# Patient Record
Sex: FEMALE | Race: WHITE | NOT HISPANIC OR LATINO | Employment: STUDENT | URBAN - METROPOLITAN AREA
[De-identification: names, ages, dates, MRNs, and addresses within clinical notes are randomized per-mention and may not be internally consistent; named-entity substitution may affect disease eponyms.]

---

## 2020-12-03 ENCOUNTER — HOSPITAL ENCOUNTER (INPATIENT)
Facility: HOSPITAL | Age: 20
LOS: 2 days | Discharge: HOME | DRG: 177 | End: 2020-12-05
Attending: EMERGENCY MEDICINE | Admitting: INTERNAL MEDICINE
Payer: OTHER GOVERNMENT

## 2020-12-03 DIAGNOSIS — R00.0 TACHYCARDIA: ICD-10-CM

## 2020-12-03 DIAGNOSIS — I26.99 ACUTE PULMONARY EMBOLISM, UNSPECIFIED PULMONARY EMBOLISM TYPE, UNSPECIFIED WHETHER ACUTE COR PULMONALE PRESENT: Primary | ICD-10-CM

## 2020-12-03 DIAGNOSIS — R06.02 SHORTNESS OF BREATH: ICD-10-CM

## 2020-12-03 DIAGNOSIS — R07.9 CHEST PAIN: ICD-10-CM

## 2020-12-03 DIAGNOSIS — I26.99 PULMONARY EMBOLISM: ICD-10-CM

## 2020-12-03 DIAGNOSIS — U07.1 COVID-19: ICD-10-CM

## 2020-12-03 LAB
ALBUMIN SERPL BCP-MCNC: 4.2 G/DL (ref 3.5–5.2)
ALP SERPL-CCNC: 81 U/L (ref 55–135)
ALT SERPL W/O P-5'-P-CCNC: 20 U/L (ref 10–44)
ANION GAP SERPL CALC-SCNC: 9 MMOL/L (ref 8–16)
APTT BLDCRRT: 27.9 SEC (ref 21–32)
AST SERPL-CCNC: 16 U/L (ref 10–40)
B-HCG UR QL: NEGATIVE
BASOPHILS # BLD AUTO: 0.03 K/UL (ref 0–0.2)
BASOPHILS NFR BLD: 0.4 % (ref 0–1.9)
BILIRUB SERPL-MCNC: 0.2 MG/DL (ref 0.1–1)
BUN SERPL-MCNC: 8 MG/DL (ref 6–20)
CALCIUM SERPL-MCNC: 9.8 MG/DL (ref 8.7–10.5)
CHLORIDE SERPL-SCNC: 107 MMOL/L (ref 95–110)
CO2 SERPL-SCNC: 22 MMOL/L (ref 23–29)
CREAT SERPL-MCNC: 0.8 MG/DL (ref 0.5–1.4)
CRP SERPL-MCNC: 9.1 MG/L (ref 0–8.2)
CTP QC/QA: YES
CTP QC/QA: YES
D DIMER PPP IA.FEU-MCNC: 3.39 MG/L FEU
DIFFERENTIAL METHOD: NORMAL
EOSINOPHIL # BLD AUTO: 0.1 K/UL (ref 0–0.5)
EOSINOPHIL NFR BLD: 1.6 % (ref 0–8)
ERYTHROCYTE [DISTWIDTH] IN BLOOD BY AUTOMATED COUNT: 12 % (ref 11.5–14.5)
EST. GFR  (AFRICAN AMERICAN): >60 ML/MIN/1.73 M^2
EST. GFR  (NON AFRICAN AMERICAN): >60 ML/MIN/1.73 M^2
GLUCOSE SERPL-MCNC: 90 MG/DL (ref 70–110)
HCT VFR BLD AUTO: 42.4 % (ref 37–48.5)
HCV AB SERPL QL IA: NEGATIVE
HGB BLD-MCNC: 14.1 G/DL (ref 12–16)
HIV 1+2 AB+HIV1 P24 AG SERPL QL IA: NEGATIVE
IMM GRANULOCYTES # BLD AUTO: 0.02 K/UL (ref 0–0.04)
IMM GRANULOCYTES NFR BLD AUTO: 0.2 % (ref 0–0.5)
INR PPP: 0.9 (ref 0.8–1.2)
LYMPHOCYTES # BLD AUTO: 1.7 K/UL (ref 1–4.8)
LYMPHOCYTES NFR BLD: 20.6 % (ref 18–48)
MCH RBC QN AUTO: 28.4 PG (ref 27–31)
MCHC RBC AUTO-ENTMCNC: 33.3 G/DL (ref 32–36)
MCV RBC AUTO: 86 FL (ref 82–98)
MONOCYTES # BLD AUTO: 0.4 K/UL (ref 0.3–1)
MONOCYTES NFR BLD: 5.3 % (ref 4–15)
NEUTROPHILS # BLD AUTO: 6 K/UL (ref 1.8–7.7)
NEUTROPHILS NFR BLD: 71.9 % (ref 38–73)
NRBC BLD-RTO: 0 /100 WBC
PLATELET # BLD AUTO: 212 K/UL (ref 150–350)
PMV BLD AUTO: 10.1 FL (ref 9.2–12.9)
POTASSIUM SERPL-SCNC: 4.1 MMOL/L (ref 3.5–5.1)
PROT SERPL-MCNC: 7.8 G/DL (ref 6–8.4)
PROTHROMBIN TIME: 10 SEC (ref 9–12.5)
RBC # BLD AUTO: 4.96 M/UL (ref 4–5.4)
SARS-COV-2 RDRP RESP QL NAA+PROBE: POSITIVE
SODIUM SERPL-SCNC: 138 MMOL/L (ref 136–145)
TROPONIN I SERPL DL<=0.01 NG/ML-MCNC: 0.01 NG/ML (ref 0–0.03)
WBC # BLD AUTO: 8.36 K/UL (ref 3.9–12.7)

## 2020-12-03 PROCEDURE — 36415 COLL VENOUS BLD VENIPUNCTURE: CPT

## 2020-12-03 PROCEDURE — 80053 COMPREHEN METABOLIC PANEL: CPT

## 2020-12-03 PROCEDURE — 96360 HYDRATION IV INFUSION INIT: CPT

## 2020-12-03 PROCEDURE — 81025 URINE PREGNANCY TEST: CPT | Performed by: EMERGENCY MEDICINE

## 2020-12-03 PROCEDURE — 86140 C-REACTIVE PROTEIN: CPT

## 2020-12-03 PROCEDURE — 86703 HIV-1/HIV-2 1 RESULT ANTBDY: CPT

## 2020-12-03 PROCEDURE — 85379 FIBRIN DEGRADATION QUANT: CPT

## 2020-12-03 PROCEDURE — 96372 THER/PROPH/DIAG INJ SC/IM: CPT | Mod: 59

## 2020-12-03 PROCEDURE — 85025 COMPLETE CBC W/AUTO DIFF WBC: CPT

## 2020-12-03 PROCEDURE — 20600001 HC STEP DOWN PRIVATE ROOM

## 2020-12-03 PROCEDURE — 84484 ASSAY OF TROPONIN QUANT: CPT

## 2020-12-03 PROCEDURE — 93005 ELECTROCARDIOGRAM TRACING: CPT

## 2020-12-03 PROCEDURE — 93010 ELECTROCARDIOGRAM REPORT: CPT | Mod: ,,, | Performed by: INTERNAL MEDICINE

## 2020-12-03 PROCEDURE — 96361 HYDRATE IV INFUSION ADD-ON: CPT

## 2020-12-03 PROCEDURE — 25500020 PHARM REV CODE 255: Performed by: EMERGENCY MEDICINE

## 2020-12-03 PROCEDURE — 86803 HEPATITIS C AB TEST: CPT

## 2020-12-03 PROCEDURE — 85610 PROTHROMBIN TIME: CPT

## 2020-12-03 PROCEDURE — U0002 COVID-19 LAB TEST NON-CDC: HCPCS | Performed by: EMERGENCY MEDICINE

## 2020-12-03 PROCEDURE — 63600175 PHARM REV CODE 636 W HCPCS: Performed by: EMERGENCY MEDICINE

## 2020-12-03 PROCEDURE — 85730 THROMBOPLASTIN TIME PARTIAL: CPT

## 2020-12-03 PROCEDURE — 93010 EKG 12-LEAD: ICD-10-PCS | Mod: ,,, | Performed by: INTERNAL MEDICINE

## 2020-12-03 PROCEDURE — 25000003 PHARM REV CODE 250: Performed by: EMERGENCY MEDICINE

## 2020-12-03 PROCEDURE — 99291 CRITICAL CARE FIRST HOUR: CPT | Mod: 25

## 2020-12-03 RX ORDER — ENOXAPARIN SODIUM 100 MG/ML
70 INJECTION SUBCUTANEOUS
Status: COMPLETED | OUTPATIENT
Start: 2020-12-03 | End: 2020-12-03

## 2020-12-03 RX ADMIN — SODIUM CHLORIDE 1000 ML: 0.9 INJECTION, SOLUTION INTRAVENOUS at 08:12

## 2020-12-03 RX ADMIN — IOHEXOL 100 ML: 350 INJECTION, SOLUTION INTRAVENOUS at 09:12

## 2020-12-03 RX ADMIN — ENOXAPARIN SODIUM 70 MG: 100 INJECTION SUBCUTANEOUS at 10:12

## 2020-12-04 PROBLEM — U07.1 COVID-19: Status: ACTIVE | Noted: 2020-12-04

## 2020-12-04 PROBLEM — I26.99 ACUTE PULMONARY EMBOLISM: Status: ACTIVE | Noted: 2020-12-04

## 2020-12-04 LAB
ALBUMIN SERPL BCP-MCNC: 3.7 G/DL (ref 3.5–5.2)
ALP SERPL-CCNC: 73 U/L (ref 55–135)
ALT SERPL W/O P-5'-P-CCNC: 16 U/L (ref 10–44)
ANION GAP SERPL CALC-SCNC: 13 MMOL/L (ref 8–16)
ASCENDING AORTA: 2.37 CM
AST SERPL-CCNC: 16 U/L (ref 10–40)
AV INDEX (PROSTH): 0.73
AV MEAN GRADIENT: 3 MMHG
AV PEAK GRADIENT: 4 MMHG
AV VALVE AREA: 2.22 CM2
AV VELOCITY RATIO: 0.72
BASOPHILS # BLD AUTO: 0.04 K/UL (ref 0–0.2)
BASOPHILS NFR BLD: 0.4 % (ref 0–1.9)
BILIRUB SERPL-MCNC: 0.3 MG/DL (ref 0.1–1)
BSA FOR ECHO PROCEDURE: 1.79 M2
BUN SERPL-MCNC: 7 MG/DL (ref 6–20)
CALCIUM SERPL-MCNC: 8.9 MG/DL (ref 8.7–10.5)
CHLORIDE SERPL-SCNC: 109 MMOL/L (ref 95–110)
CO2 SERPL-SCNC: 18 MMOL/L (ref 23–29)
CREAT SERPL-MCNC: 0.7 MG/DL (ref 0.5–1.4)
CV ECHO LV RWT: 0.35 CM
DIFFERENTIAL METHOD: ABNORMAL
DOP CALC AO PEAK VEL: 1.02 M/S
DOP CALC AO VTI: 17.8 CM
DOP CALC LVOT AREA: 3 CM2
DOP CALC LVOT DIAMETER: 1.97 CM
DOP CALC LVOT PEAK VEL: 0.73 M/S
DOP CALC LVOT STROKE VOLUME: 39.6 CM3
DOP CALCLVOT PEAK VEL VTI: 13 CM
ECHO LV POSTERIOR WALL: 0.68 CM (ref 0.6–1.1)
EOSINOPHIL # BLD AUTO: 0.2 K/UL (ref 0–0.5)
EOSINOPHIL NFR BLD: 2 % (ref 0–8)
ERYTHROCYTE [DISTWIDTH] IN BLOOD BY AUTOMATED COUNT: 12.2 % (ref 11.5–14.5)
EST. GFR  (AFRICAN AMERICAN): >60 ML/MIN/1.73 M^2
EST. GFR  (NON AFRICAN AMERICAN): >60 ML/MIN/1.73 M^2
FRACTIONAL SHORTENING: 32 % (ref 28–44)
GLUCOSE SERPL-MCNC: 89 MG/DL (ref 70–110)
HCT VFR BLD AUTO: 39.8 % (ref 37–48.5)
HGB BLD-MCNC: 12.7 G/DL (ref 12–16)
IMM GRANULOCYTES # BLD AUTO: 0.01 K/UL (ref 0–0.04)
IMM GRANULOCYTES NFR BLD AUTO: 0.1 % (ref 0–0.5)
INTERVENTRICULAR SEPTUM: 0.71 CM (ref 0.6–1.1)
LA MAJOR: 3.74 CM
LA MINOR: 3.47 CM
LA WIDTH: 2.53 CM
LEFT ATRIUM SIZE: 2.85 CM
LEFT ATRIUM VOLUME INDEX: 12.6 ML/M2
LEFT ATRIUM VOLUME: 22.06 CM3
LEFT INTERNAL DIMENSION IN SYSTOLE: 2.6 CM (ref 2.1–4)
LEFT VENTRICLE DIASTOLIC VOLUME INDEX: 36.27 ML/M2
LEFT VENTRICLE DIASTOLIC VOLUME: 63.62 ML
LEFT VENTRICLE MASS INDEX: 41 G/M2
LEFT VENTRICLE SYSTOLIC VOLUME INDEX: 14 ML/M2
LEFT VENTRICLE SYSTOLIC VOLUME: 24.61 ML
LEFT VENTRICULAR INTERNAL DIMENSION IN DIASTOLE: 3.84 CM (ref 3.5–6)
LEFT VENTRICULAR MASS: 72.51 G
LYMPHOCYTES # BLD AUTO: 3 K/UL (ref 1–4.8)
LYMPHOCYTES NFR BLD: 32.8 % (ref 18–48)
MCH RBC QN AUTO: 28.6 PG (ref 27–31)
MCHC RBC AUTO-ENTMCNC: 31.9 G/DL (ref 32–36)
MCV RBC AUTO: 90 FL (ref 82–98)
MONOCYTES # BLD AUTO: 0.6 K/UL (ref 0.3–1)
MONOCYTES NFR BLD: 6.5 % (ref 4–15)
NEUTROPHILS # BLD AUTO: 5.2 K/UL (ref 1.8–7.7)
NEUTROPHILS NFR BLD: 58.2 % (ref 38–73)
NRBC BLD-RTO: 0 /100 WBC
PISA TR MAX VEL: 2.9 M/S
PLATELET # BLD AUTO: 219 K/UL (ref 150–350)
PMV BLD AUTO: 11.2 FL (ref 9.2–12.9)
POTASSIUM SERPL-SCNC: 3.8 MMOL/L (ref 3.5–5.1)
PROT SERPL-MCNC: 7 G/DL (ref 6–8.4)
RA MAJOR: 4.04 CM
RA WIDTH: 2.2 CM
RBC # BLD AUTO: 4.44 M/UL (ref 4–5.4)
RIGHT VENTRICULAR END-DIASTOLIC DIMENSION: 2.76 CM
SINUS: 2.6 CM
SODIUM SERPL-SCNC: 140 MMOL/L (ref 136–145)
STJ: 2.41 CM
TDI LATERAL: 0.11 M/S
TDI SEPTAL: 0.11 M/S
TDI: 0.11 M/S
TR MAX PG: 34 MMHG
TRICUSPID ANNULAR PLANE SYSTOLIC EXCURSION: 1.84 CM
WBC # BLD AUTO: 8.99 K/UL (ref 3.9–12.7)

## 2020-12-04 PROCEDURE — 36415 COLL VENOUS BLD VENIPUNCTURE: CPT

## 2020-12-04 PROCEDURE — 85025 COMPLETE CBC W/AUTO DIFF WBC: CPT

## 2020-12-04 PROCEDURE — 85300 ANTITHROMBIN III ACTIVITY: CPT

## 2020-12-04 PROCEDURE — 99900035 HC TECH TIME PER 15 MIN (STAT)

## 2020-12-04 PROCEDURE — 25000003 PHARM REV CODE 250: Performed by: HOSPITALIST

## 2020-12-04 PROCEDURE — 27000646 HC AEROBIKA DEVICE

## 2020-12-04 PROCEDURE — 63600175 PHARM REV CODE 636 W HCPCS: Performed by: HOSPITALIST

## 2020-12-04 PROCEDURE — 81241 F5 GENE: CPT

## 2020-12-04 PROCEDURE — 94664 DEMO&/EVAL PT USE INHALER: CPT

## 2020-12-04 PROCEDURE — 99223 1ST HOSP IP/OBS HIGH 75: CPT | Mod: ,,, | Performed by: HOSPITALIST

## 2020-12-04 PROCEDURE — 20600001 HC STEP DOWN PRIVATE ROOM

## 2020-12-04 PROCEDURE — 80053 COMPREHEN METABOLIC PANEL: CPT

## 2020-12-04 PROCEDURE — 25000003 PHARM REV CODE 250: Performed by: INTERNAL MEDICINE

## 2020-12-04 PROCEDURE — 99223 PR INITIAL HOSPITAL CARE,LEVL III: ICD-10-PCS | Mod: ,,, | Performed by: HOSPITALIST

## 2020-12-04 PROCEDURE — 25500020 PHARM REV CODE 255: Performed by: INTERNAL MEDICINE

## 2020-12-04 PROCEDURE — 94761 N-INVAS EAR/PLS OXIMETRY MLT: CPT

## 2020-12-04 RX ORDER — IBUPROFEN 200 MG
24 TABLET ORAL
Status: DISCONTINUED | OUTPATIENT
Start: 2020-12-04 | End: 2020-12-05 | Stop reason: HOSPADM

## 2020-12-04 RX ORDER — SODIUM CHLORIDE 0.9 % (FLUSH) 0.9 %
10 SYRINGE (ML) INJECTION
Status: DISCONTINUED | OUTPATIENT
Start: 2020-12-04 | End: 2020-12-05 | Stop reason: HOSPADM

## 2020-12-04 RX ORDER — IBUPROFEN 200 MG
16 TABLET ORAL
Status: DISCONTINUED | OUTPATIENT
Start: 2020-12-04 | End: 2020-12-05 | Stop reason: HOSPADM

## 2020-12-04 RX ORDER — TALC
6 POWDER (GRAM) TOPICAL NIGHTLY PRN
Status: DISCONTINUED | OUTPATIENT
Start: 2020-12-04 | End: 2020-12-05 | Stop reason: HOSPADM

## 2020-12-04 RX ORDER — ONDANSETRON 2 MG/ML
4 INJECTION INTRAMUSCULAR; INTRAVENOUS EVERY 8 HOURS PRN
Status: DISCONTINUED | OUTPATIENT
Start: 2020-12-04 | End: 2020-12-05 | Stop reason: HOSPADM

## 2020-12-04 RX ORDER — ALBUTEROL SULFATE 90 UG/1
2 AEROSOL, METERED RESPIRATORY (INHALATION) EVERY 4 HOURS PRN
Status: DISCONTINUED | OUTPATIENT
Start: 2020-12-04 | End: 2020-12-05 | Stop reason: HOSPADM

## 2020-12-04 RX ORDER — ACETAMINOPHEN 325 MG/1
650 TABLET ORAL EVERY 4 HOURS PRN
Status: DISCONTINUED | OUTPATIENT
Start: 2020-12-04 | End: 2020-12-05 | Stop reason: HOSPADM

## 2020-12-04 RX ORDER — MUPIROCIN 20 MG/G
OINTMENT TOPICAL 2 TIMES DAILY
Status: DISCONTINUED | OUTPATIENT
Start: 2020-12-04 | End: 2020-12-05 | Stop reason: HOSPADM

## 2020-12-04 RX ORDER — ENOXAPARIN SODIUM 100 MG/ML
1 INJECTION SUBCUTANEOUS
Status: DISCONTINUED | OUTPATIENT
Start: 2020-12-04 | End: 2020-12-04

## 2020-12-04 RX ADMIN — MELATONIN TAB 3 MG 6 MG: 3 TAB at 08:12

## 2020-12-04 RX ADMIN — ENOXAPARIN SODIUM 70 MG: 100 INJECTION SUBCUTANEOUS at 08:12

## 2020-12-04 RX ADMIN — MUPIROCIN: 20 OINTMENT TOPICAL at 08:12

## 2020-12-04 RX ADMIN — HUMAN ALBUMIN MICROSPHERES AND PERFLUTREN 0.66 MG: 10; .22 INJECTION, SOLUTION INTRAVENOUS at 02:12

## 2020-12-04 RX ADMIN — APIXABAN 10 MG: 5 TABLET, FILM COATED ORAL at 08:12

## 2020-12-04 NOTE — ED PROVIDER NOTES
Encounter Date: 12/3/2020    SCRIBE #1 NOTE: INicole, am scribing for, and in the presence of, Dr. Saavedra.   SCRIBE #2 NOTE: I, Bceka Mena, am scribing for, and in the presence of, Dr. Saavedra.     History     Chief Complaint   Patient presents with    Shortness of Breath     Tested positive for Covid 19 last week.  Today states that she is feeling SOA and heart is racing.  Patient states hx of asthma     Time seen by provider: 8:45 PM    This is a 20 y.o. female with history of asthma who presents with complaint of shortness of breath for nine days. She reports associated cough, fever, body aches, and congestion. She tested positive for COVID-19 eight days ago. Her symptoms were improving yesterday yesterday, though she woke from sleep with increased shortness of breath and chest pain today. She no longer has fever. She denies nausea, vomiting, diarrhea, or any urinary symptoms.    The history is provided by the patient.     Review of patient's allergies indicates:  No Known Allergies  Past Medical History:   Diagnosis Date    Asthma      History reviewed. No pertinent surgical history.  No family history on file.  Social History     Tobacco Use    Smoking status: Never Smoker   Substance Use Topics    Alcohol use: Yes     Comment: occasionally    Drug use: Not on file     Review of Systems   Constitutional: Positive for chills and fever (resolved).   HENT: Positive for congestion. Negative for sore throat.    Respiratory: Positive for cough and shortness of breath.    Cardiovascular: Positive for chest pain.   Gastrointestinal: Negative for diarrhea, nausea and vomiting.   Genitourinary: Negative for dysuria.   Musculoskeletal: Positive for myalgias. Negative for back pain.   Skin: Negative for rash.   Neurological: Negative for dizziness, weakness and headaches.   Hematological: Does not bruise/bleed easily.       Physical Exam     Initial Vitals [12/03/20 1819]   BP Pulse Resp Temp SpO2   (!) 141/95  (!) 116 20 97.9 °F (36.6 °C) 97 %      MAP       --         Physical Exam    Nursing note and vitals reviewed.  Constitutional: She appears well-developed and well-nourished. She is not diaphoretic. No distress.   HENT:   Head: Normocephalic and atraumatic.   Eyes: Conjunctivae and EOM are normal.   Neck: Normal range of motion.   Cardiovascular: Regular rhythm and normal heart sounds. Tachycardia present.    Pulmonary/Chest: Breath sounds normal. No respiratory distress. She has no wheezes. She has no rhonchi. She has no rales.   Slight increased work of breathing.   Abdominal: Soft. Bowel sounds are normal. She exhibits no distension. There is no abdominal tenderness.   Musculoskeletal: Normal range of motion. No tenderness or edema.   Neurological: She is alert and oriented to person, place, and time.   Ambulatory with steady gait   Skin: Skin is warm and dry. Capillary refill takes less than 2 seconds.   Psychiatric: She has a normal mood and affect. Her behavior is normal.         ED Course   Critical Care    Date/Time: 12/3/2020 11:19 PM  Performed by: Anabel Saavedra MD  Authorized by: Anabel Saavedra MD   Direct patient critical care time: 10 minutes  Additional history critical care time: 5 minutes  Ordering / reviewing critical care time: 5 minutes  Documentation critical care time: 5 minutes  Consulting other physicians critical care time: 5 minutes  Total critical care time (exclusive of procedural time) : 30 minutes  Critical care time was exclusive of separately billable procedures and treating other patients and teaching time.  Critical care was necessary to treat or prevent imminent or life-threatening deterioration of the following conditions: circulatory failure.  Critical care was time spent personally by me on the following activities: evaluation of patient's response to treatment, obtaining history from patient or surrogate, ordering and review of laboratory studies, pulse oximetry, review of  old charts, re-evaluation of patient's condition, ordering and review of radiographic studies, ordering and performing treatments and interventions, discussions with consultants, development of treatment plan with patient or surrogate and examination of patient.        Labs Reviewed   COMPREHENSIVE METABOLIC PANEL - Abnormal; Notable for the following components:       Result Value    CO2 22 (*)     All other components within normal limits   C-REACTIVE PROTEIN - Abnormal; Notable for the following components:    CRP 9.1 (*)     All other components within normal limits   D DIMER, QUANTITATIVE - Abnormal; Notable for the following components:    D-Dimer 3.39 (*)     All other components within normal limits   SARS-COV-2 RDRP GENE - Abnormal; Notable for the following components:    POC Rapid COVID Positive (*)     All other components within normal limits   HIV 1 / 2 ANTIBODY   HEPATITIS C ANTIBODY   CBC W/ AUTO DIFFERENTIAL   PROTIME-INR   APTT   PROTIME-INR   APTT   TROPONIN I   POCT URINE PREGNANCY     EKG Readings: (Independently Interpreted)   1848: Sinus tachycardia at a rate of 106 bpm. Normal intervals. Normal axis. No ST or ischemic changes.        Imaging Results          CTA Chest Non-Coronary (PE Study) (Final result)  Result time 12/03/20 21:41:46    Final result by Pura Urena MD (12/03/20 21:41:46)                 Impression:      Extensive right-sided pulmonary thromboemboli.  Additional small segmental emboli seen on the left.  No convincing evidence of right heart strain at this time.  No evidence of pulmonary hemorrhage or infarction.    COMMUNICATION  This critical result was discovered/received on 12/3/2020 at 21:35.    The critical information above was relayed directly by Pura Urena MD by telephone to Dr. Anabel Saavedra on 12/3/2020 at 21:39.      Electronically signed by: Pura Urena MD  Date:    12/03/2020  Time:    21:41             Narrative:    EXAMINATION:  CTA CHEST NON  CORONARY    CLINICAL HISTORY:  PE suspected, intermediate prob, positive D-dimer;    TECHNIQUE:  Low dose axial images, sagittal and coronal reformations were obtained from the thoracic inlet to the lung bases following the IV administration of 100 mL of Omnipaque 350.  Contrast timing was optimized to evaluate the pulmonary arteries.  MIP images were performed.    COMPARISON:  None    FINDINGS:  Structures at the base of the neck are unremarkable.  Aorta is non-aneurysmal.  The heart is normal in size without pericardial effusion.  Pulmonary thromboembolus is seen within right main pulmonary artery extending into the upper, middle, and lobar arteries as well as segmental branches.  Suspected small segmental emboli also seen involving a left upper and lower lobe segmental branch.  No evidence of more central or saddle embolus.  There is no evidence of mediastinal, axillary, or hilar lymph node enlargement.  The esophagus is unremarkable along its course.    The trachea and bronchi are patent.  The lungs are symmetrically expanded.  Lungs are clear with no evidence of pulmonary hemorrhage or infarction.  No consolidation or pleural effusion.    The visualized abdominal structures are unremarkable.  No acute osseous abnormality identified.  Extrathoracic soft tissues are unremarkable.                               X-Ray Chest AP Portable (Final result)  Result time 12/03/20 19:46:52    Final result by Pura Urena MD (12/03/20 19:46:52)                 Impression:      No acute cardiopulmonary process identified.      Electronically signed by: Pura Urena MD  Date:    12/03/2020  Time:    19:46             Narrative:    EXAMINATION:  XR CHEST AP PORTABLE    CLINICAL HISTORY:  Shortness of breath    TECHNIQUE:  Single frontal view of the chest was performed.    COMPARISON:  None    FINDINGS:  Cardiac silhouette is normal in size.  Lungs are symmetrically expanded.  No evidence of focal consolidative process,  pneumothorax, or significant pleural effusion.  No acute osseous abnormality identified.                              X-Rays:   Independently Interpreted Readings:   Chest X-Ray: Trachea midline. No cardiomegaly. No effusion, edema or pneumothorax.      Medical Decision Making:   History:   Old Medical Records: I decided to obtain old medical records.  Old Records Summarized: other records.  Initial Assessment:   8:45PM:  Patient is a 20-year-old female who presents to the emergency department with worsening chest pain or shortness of breath after being diagnosed with COVID last week.  Patient appears well, nontoxic.  She is tachycardic and she does seem short of breath on exam.  Will plan for labs, x-ray, will continue to follow and reassess.  Independently Interpreted Test(s):   I have ordered and independently interpreted X-rays - see prior notes.  I have ordered and independently interpreted EKG Reading(s) - see prior notes  Clinical Tests:   Lab Tests: Ordered and Reviewed  Radiological Study: Ordered and Reviewed  Medical Tests: Ordered and Reviewed  Other:   I have discussed this case with another health care provider.    9:04PM:  Patient has a normal chest x-ray.  However she does have a very elevated D-dimer.  Will plan for a CTA for further evaluation  Will continue to follow.    9:39PM:  I spoke with radiologist regarding the patient's CTA.  Patient has a large right-sided pulmonary embolism, though with no evidence of right heart strain.    9:47 PM:  I discussed with the transfer center regarding the need to transfer the patient.  Will continue to follow.    9:55PM:  I updated the patient regarding the results along with plan for transfer to [facility].  Patient agreeable to plan and all questions answered.      10:08 PM: Discussed case with Dr. Munroe at [facility], who accepted transfer.            Scribe Attestation:   Scribe #1: I performed the above scribed service and the documentation accurately  describes the services I performed. I attest to the accuracy of the note.  Scribe #2: I performed the above scribed service and the documentation accurately describes the services I performed. I attest to the accuracy of the note.    Attending Attestation:           Physician Attestation for Scribe:  Physician Attestation Statement for Scribe #1: I, Dr. Saavedra, reviewed documentation, as scribed by Nicole Guillen in my presence, and it is both accurate and complete.   Physician Attestation Statement for Scribe #2: I, Dr. Saavedra, reviewed documentation, as scribed by Becka Mena in my presence, and it is both accurate and complete. I also acknowledge and confirm the content of the note done by Scribe #1.                        Clinical Impression:       ICD-10-CM ICD-9-CM   1. Acute pulmonary embolism, unspecified pulmonary embolism type, unspecified whether acute cor pulmonale present  I26.99 415.19   2. Tachycardia  R00.0 785.0   3. Shortness of breath  R06.02 786.05   4. Chest pain  R07.9 786.50   5. COVID-19  U07.1 079.89                          ED Disposition Condition    Transfer to Another Facility Stable                            Anabel Saavedra MD  12/03/20 [id number]

## 2020-12-04 NOTE — ED TRIAGE NOTES
Pt presents to ER via POV with c/o worsening SOB and midsternal CP that radiates to midback with inspiration x1 week. Pt reports testing positive for COVID appx 1 week ago. Denies fever/chills. Hx asthma.

## 2020-12-04 NOTE — PLAN OF CARE
-POST-ROUNDS UPDATE-    Ms. Hale is a 20-year-old woman with asthma who presented to [facility] with dyspnea and chest pain and was diagnosed with acute pulmonary embolism without cor pulmonale. She had recently tested positive for COVID-19 and was transferred to the COVID unit at [facility]. No signs of right heart strain on admission CTA. Here she has remained hemodynamically stable on room air, tolerating therapeutic LMWH without issue. PESI 42, I.e. class I indicating low risk from PE with the caveat that COVID patients were not a part of the creation of that risk assessment. Will plan for factor V Leiden testing as well as TTE and lower extremity duplex. Her symptoms are improving.    - Continue therapeutic dose LMWH, will swap to DOAC tonight if TTE completed and treat for at least 3 months for provoked VTE  - TTE  - US lower extremity duplex  - Factor V Leiden ordered. She will need to complete the remainder of her hypercoagulability workup after treatment for her VTE has concluded  - No steroid, antiviral or plasma therapy indicated given absent respiratory failure  - Tele ordered    Rigo Gonzalez M.D.  Department of Hospital Medicine  [facility]  [phone] (pager)

## 2020-12-04 NOTE — PLAN OF CARE
Problem: Adult Inpatient Plan of Care  Goal: Plan of Care Review  Outcome: Ongoing, Progressing  Goal: Patient-Specific Goal (Individualization)  Outcome: Ongoing, Progressing  Goal: Absence of Hospital-Acquired Illness or Injury  Outcome: Ongoing, Progressing  Goal: Optimal Comfort and Wellbeing  Outcome: Ongoing, Progressing  Goal: Readiness for Transition of Care  Outcome: Ongoing, Progressing  Goal: Rounds/Family Conference  Outcome: Ongoing, Progressing     Problem: Venous Thromboembolism  Goal: VTE (Venous Thromboembolism) Symptom Resolution  Outcome: Ongoing, Progressing

## 2020-12-04 NOTE — CARE UPDATE
At Ms. Hale' request I called her father Ken at [phone] and updated him about her diagnosis and the plan going forward. All questions answered.    Rigo Gonzalez M.D.  Department of [facility]  [facility]  [phone] (pager)

## 2020-12-04 NOTE — PLAN OF CARE
(Physician in Lead of Transfers)   Outside Transfer Acceptance Note / Regional Referral Center    Upon patient arrival to floor, please call extension [phone] (if no answer, this will flip to a beeper, so enter your call back number) for Hospital Medicine admit team assignment and for additional admit orders for the patient.  Do not page the attending physician associated with the patient on arrival (this physician may not be on duty at the time of arrival).  Rather, always call [phone] to reach the triage physician for orders and team assignment.    Transferring Physician: Anabel Saavedra MD    Accepting Physician: Ambar Munroe MD    Date of Acceptance: 12/03/2020    Transferring Facility: [facility] ([facility])    Reason for Transfer: COVID +    Report from Transferring Physician/Hospital course:  Patient is a 20 y.o. female who has a past medical history of Asthma presented with complaint of SOB that began 2 days ago. She reports cough, fever, body aches, congestion for a week. She tested positive for COVID-19 2 days ago. Her symptoms have improved though she now feels SOB with chest pain. Work up in ED showed elevated D-dimer. CTA with extensive right-sided pulmonary thromboemboli. No signs of right heart strain. Hemodynamically stable. Not hypoxic. Started on full dose Lovenox. Transferring due to COVID status.       Vital Signs (Most Recent):  Temp: 97.9 °F (36.6 °C) (12/03/20 1819)  Pulse: 99 (12/03/20 2158)  Resp: 20 (12/03/20 1819)  BP: (!) 171/89 (12/03/20 2131)  SpO2: 98 % (12/03/20 2158) Vital Signs (24h Range):  Temp:  [97.9 °F (36.6 °C)] 97.9 °F (36.6 °C)  Pulse:  [] 99  Resp:  [20] 20  SpO2:  [97 %-100 %] 98 %  BP: (112-171)/(66-95) 171/89       Labs & Radiographs: see EPIC    Significant Labs:   CMP:   Recent Labs   Lab 12/03/20 2027      K 4.1      CO2 22*   GLU 90   BUN 8   CREATININE 0.8   CALCIUM 9.8   PROT 7.8   ALBUMIN 4.2   BILITOT 0.2   ALKPHOS 81   AST 16   ALT 20    ANIONGAP 9   ESTGFRAFRICA >60   EGFRNONAA >60   , CBC:   Recent Labs   Lab 12/03/20 2027   WBC 8.36   HGB 14.1   HCT 42.4      , INR:   Recent Labs   Lab 12/03/20 2027   INR 0.9   , Lipid Panel No results for input(s): CHOL, HDL, LDLCALC, TRIG, CHOLHDL in the last 48 hours. and Troponin No results for input(s): TROPONINI in the last 48 hours.    Significant Imaging:  CTA Chest Non-Coronary (PE Study)  Narrative: EXAMINATION:  CTA CHEST NON CORONARY    CLINICAL HISTORY:  PE suspected, intermediate prob, positive D-dimer;    TECHNIQUE:  Low dose axial images, sagittal and coronal reformations were obtained from the thoracic inlet to the lung bases following the IV administration of 100 mL of Omnipaque 350.  Contrast timing was optimized to evaluate the pulmonary arteries.  MIP images were performed.    COMPARISON:  None    FINDINGS:  Structures at the base of the neck are unremarkable.  Aorta is non-aneurysmal.  The heart is normal in size without pericardial effusion.  Pulmonary thromboembolus is seen within right main pulmonary artery extending into the upper, middle, and lobar arteries as well as segmental branches.  Suspected small segmental emboli also seen involving a left upper and lower lobe segmental branch.  No evidence of more central or saddle embolus.  There is no evidence of mediastinal, axillary, or hilar lymph node enlargement.  The esophagus is unremarkable along its course.    The trachea and bronchi are patent.  The lungs are symmetrically expanded.  Lungs are clear with no evidence of pulmonary hemorrhage or infarction.  No consolidation or pleural effusion.    The visualized abdominal structures are unremarkable.  No acute osseous abnormality identified.  Extrathoracic soft tissues are unremarkable.  Impression: Extensive right-sided pulmonary thromboemboli.  Additional small segmental emboli seen on the left.  No convincing evidence of right heart strain at this time.  No evidence of  pulmonary hemorrhage or infarction.    COMMUNICATION  This critical result was discovered/received on 12/3/2020 at 21:35.    The critical information above was relayed directly by Pura Urena MD by telephone to Dr. Anabel Saavedra on 12/3/2020 at 21:39.    Electronically signed by: Pura Urena MD  Date:    12/03/2020  Time:    21:41  X-Ray Chest AP Portable  Narrative: EXAMINATION:  XR CHEST AP PORTABLE    CLINICAL HISTORY:  Shortness of breath    TECHNIQUE:  Single frontal view of the chest was performed.    COMPARISON:  None    FINDINGS:  Cardiac silhouette is normal in size.  Lungs are symmetrically expanded.  No evidence of focal consolidative process, pneumothorax, or significant pleural effusion.  No acute osseous abnormality identified.  Impression: No acute cardiopulmonary process identified.    Electronically signed by: Pura Urena MD  Date:    12/03/2020  Time:    19:46        To Do List:   1. Admit to  COVID unit  2. Management per COVID protocol   3. Cont Lovenox       Upon patient arrival to floor, please call extension [phone] (if no answer, this will flip to a beeper, so enter your call back number) for Hospital Medicine admit team assignment and for additional admit orders for the patient.  Do not page the attending physician associated with the patient on arrival (this physician may not be on duty at the time of arrival).  Rather, always call [phone] to reach the triage physician for orders and team assignment.      Ambar Munroe MD  Hospital Medicine Staff

## 2020-12-04 NOTE — PLAN OF CARE
Per patient request - This  set up hospital follow-up with local provider and [facility] in [place]. Karie Ahumada NP hospital follow-up on 12/18/2020 @ 1:30pm (Arrive 15 minutes prior to appointment). Appointment noted on AVS.    Jahaira Yeager RN  Case Management  Ext [phone]

## 2020-12-04 NOTE — H&P
Hospital Medicine  History and Physical Exam    Team: [facility] HOSP MED G Ermias Bowman MD  Admit Date: 12/3/2020  Principal Problem:  Acute pulmonary embolism   Patient information was obtained from patient, past medical records and ER records.   Primary care Physician: Primary Doctor No  Code status: Full Code    HPI: Per : Patient is a 20 y.o. female who has a past medical history of Asthma presented with complaint of SOB that began 2 days ago. She reports cough, fever, body aches, congestion for a week. She tested positive for COVID-19 2 days ago. Her symptoms have improved though she now feels SOB with chest pain. Work up in ED showed elevated D-dimer. CTA with extensive right-sided pulmonary thromboemboli. No signs of right heart strain. Hemodynamically stable. Not hypoxic. Started on full dose Lovenox. Transferring due to COVID status.     On my assessment, pt. Resting comfortably. She provides a similar history to above. She reports developing URI symptoms about 10 days ago,  tested positive about 8 days ago. She states that she had cough, fatigue, and body aches. She would occasionally have HOPE.This monring, however she woke up with worsening SOB and chest pain. She denies any notable leg swelling or pain. She denies any family history of blood clotting disorders. She does report taking oral contraceptives    No results found for: HGBA1C    Past Medical History: Patient has a past medical history of Asthma.    Past Surgical History: Patient has no past surgical history on file.    Social History: Patient reports that she has never smoked. She does not have any smokeless tobacco history on file. She reports current alcohol use.    Family History: No reported history of clotting disorders  Medications: reviewed     Allergies: Patient has No Known Allergies.    ROS  Pain Scale:0 /10   Constitutional: no fever or chills  Respiratory: Positive for cough, SOB  Cardiovascular: Positive for chest  pain  Gastrointestinal: no nausea or vomiting, no abdominal pain or change in bowel habits  Genitourinary: no hematuria or dysuria  Integument/Breast: no rash or pruritis  Hematologic/Lymphatic: no easy bruising or lymphadenopathy  Musculoskeletal: no arthralgias or myalgias  Neurological: no seizures or tremors  Behavioral/Psych: no depression or anxiety    PEx  Temp:  [97.9 °F (36.6 °C)-98.1 °F (36.7 °C)]   Pulse:  []   Resp:  [18-20]   BP: (112-171)/(66-95)   SpO2:  [97 %-100 %]   Body mass index is 28.34 kg/m².   No intake or output data in the 24 hours ending 12/04/20 0015    General appearance: no distress, pt. Resting comfortably  Mental status: Alert and oriented x 3  HEENT:  conjunctivae/corneas clear, PERRL  Neck: supple, thyroid not enlarged  Pulm:   normal respiratory effort, CTA B, no c/w/r  Card: RRR, S1, S2 normal, no murmur, click, rub or gallop  Abd: soft, NT, ND, BS present; no masses, no organomegaly  Ext: no c/c/e  Pulses: 2+, symmetric  Skin: color, texture, turgor normal. No rashes or lesions  Neuro: CN II-XII grossly intact, no focal numbness or weakness, normal strength and tone     Recent Results (from the past 24 hour(s))   POCT urine pregnancy    Collection Time: 12/03/20  6:38 PM   Result Value Ref Range    POC Preg Test, Ur Negative Negative     Acceptable Yes    HIV 1/2 Ag/Ab (4th Gen)    Collection Time: 12/03/20  6:54 PM   Result Value Ref Range    HIV 1/2 Ag/Ab Negative Negative   Hepatitis C antibody    Collection Time: 12/03/20  6:54 PM   Result Value Ref Range    Hepatitis C Ab Negative Negative   Comprehensive metabolic panel    Collection Time: 12/03/20  8:27 PM   Result Value Ref Range    Sodium 138 136 - 145 mmol/L    Potassium 4.1 3.5 - 5.1 mmol/L    Chloride 107 95 - 110 mmol/L    CO2 22 (L) 23 - 29 mmol/L    Glucose 90 70 - 110 mg/dL    BUN 8 6 - 20 mg/dL    Creatinine 0.8 0.5 - 1.4 mg/dL    Calcium 9.8 8.7 - 10.5 mg/dL    Total Protein 7.8 6.0 - 8.4  g/dL    Albumin 4.2 3.5 - 5.2 g/dL    Total Bilirubin 0.2 0.1 - 1.0 mg/dL    Alkaline Phosphatase 81 55 - 135 U/L    AST 16 10 - 40 U/L    ALT 20 10 - 44 U/L    Anion Gap 9 8 - 16 mmol/L    eGFR if African American >60 >60 mL/min/1.73 m^2    eGFR if non African American >60 >60 mL/min/1.73 m^2   CBC auto differential    Collection Time: 12/03/20  8:27 PM   Result Value Ref Range    WBC 8.36 3.90 - 12.70 K/uL    RBC 4.96 4.00 - 5.40 M/uL    Hemoglobin 14.1 12.0 - 16.0 g/dL    Hematocrit 42.4 37.0 - 48.5 %    MCV 86 82 - 98 fL    MCH 28.4 27.0 - 31.0 pg    MCHC 33.3 32.0 - 36.0 g/dL    RDW 12.0 11.5 - 14.5 %    Platelets 212 150 - 350 K/uL    MPV 10.1 9.2 - 12.9 fL    Immature Granulocytes 0.2 0.0 - 0.5 %    Gran # (ANC) 6.0 1.8 - 7.7 K/uL    Immature Grans (Abs) 0.02 0.00 - 0.04 K/uL    Lymph # 1.7 1.0 - 4.8 K/uL    Mono # 0.4 0.3 - 1.0 K/uL    Eos # 0.1 0.0 - 0.5 K/uL    Baso # 0.03 0.00 - 0.20 K/uL    nRBC 0 0 /100 WBC    Gran % 71.9 38.0 - 73.0 %    Lymph % 20.6 18.0 - 48.0 %    Mono % 5.3 4.0 - 15.0 %    Eosinophil % 1.6 0.0 - 8.0 %    Basophil % 0.4 0.0 - 1.9 %    Differential Method Automated    C-reactive protein    Collection Time: 12/03/20  8:27 PM   Result Value Ref Range    CRP 9.1 (H) 0.0 - 8.2 mg/L   D dimer, quantitative    Collection Time: 12/03/20  8:27 PM   Result Value Ref Range    D-Dimer 3.39 (H) <0.50 mg/L FEU   Protime-INR    Collection Time: 12/03/20  8:27 PM   Result Value Ref Range    Prothrombin Time 10.0 9.0 - 12.5 sec    INR 0.9 0.8 - 1.2   APTT    Collection Time: 12/03/20  8:27 PM   Result Value Ref Range    aPTT 27.9 21.0 - 32.0 sec   Troponin I    Collection Time: 12/03/20  8:27 PM   Result Value Ref Range    Troponin I 0.008 0.000 - 0.026 ng/mL   POCT COVID-19 Rapid Screening    Collection Time: 12/03/20 10:02 PM   Result Value Ref Range    POC Rapid COVID Positive (A) Negative     Acceptable Yes        No results for input(s): POCTGLUCOSE in the last 168  hours.    There are no hospital problems to display for this patient.        Assessment and Plan:  Acute PE  -Continue 1mg/kg lovenox therapy. No R heart strain noted on CTA, vitals stable on RA, and tropnin WNL. Official TTE ordered and pending   -PE could be considered provoked from COVID-19 and also birth control use, but consider hypercoaguable work-up inpatient vs. outpatient given patient's young age  -Plan to transition to DOAC at discharge    COVID-19  - COVID-19 testing POSITIVE    -No acute issues related to COVID-19 other than above PE. No need for abx therapy or steroids. Continue to monitor paeint with outline of care below.      - Infection Control notified     - Isolation:   - Airborne, Contact and Droplet Precautions  - Cohort patients into COVID units  - N95 mask, wear eye protection  - 20 second hand hygiene              - Limit visitors per hospital policy              - Consolidating lab draws, nursing care, provider visits, and interventions    - Diagnostics: (leukopenia, hyponatremia, hyperferritinemia, elevated troponin, elevated d-dimer, age, and comorbidities are significant predictors of poor clinical outcome)  CBC, CMP, Procalcitonin, Ferritin, CRP, BNP, Troponin, ECG and Portable CXR    - Management:  Supplemental O2 to maintain SpO2 >92%  Telemetry  Continuous/intermittent Pulse Ox  Albuterol treatment PRN        DVT PPx: Lovenox    Ermias Bowman MD  Hospital Medicine Staff  [phone] pager

## 2020-12-04 NOTE — FIRST PROVIDER EVALUATION
Emergency Department TeleTriage Encounter Note      CHIEF COMPLAINT    Chief Complaint   Patient presents with    Shortness of Breath     Tested positive for Covid 19 last week.  Today states that she is feeling SOA and heart is racing.  Patient states hx of asthma       VITAL SIGNS   Initial Vitals [12/03/20 1819]   BP Pulse Resp Temp SpO2   (!) 141/95 (!) 116 20 97.9 °F (36.6 °C) 97 %      MAP       --            ALLERGIES    Review of patient's allergies indicates:  No Known Allergies    PROVIDER TRIAGE NOTE  This is a teletriage evaluation of a 20 y.o. female presenting to the ED with c/o shortness of breath and heart racing sensation. +COVID. Hx of asthma, used inhaler at home, not helping. Initial orders will be placed and care will be transferred to an alternate provider when patient is roomed for a full evaluation. Any additional orders and the final disposition will be determined by that provider.         ORDERS  Labs Reviewed   HIV 1 / 2 ANTIBODY   HEPATITIS C ANTIBODY   POCT URINE PREGNANCY       ED Orders (720h ago, onward)    Start Ordered     Status Ordering Provider    12/03/20 1829 12/03/20 1828  Airborne and Contact and Droplet Isolation Status  Continuous      Ordered [facility]    12/03/20 1828 12/03/20 1827  X-Ray Chest AP Portable  1 time imaging      Ordered [facility]    12/03/20 1827 12/03/20 1827  EKG 12-lead  Once      Ordered [facility]    12/03/20 1822 12/03/20 1821  HIV 1/2 Ag/Ab (4th Gen)  STAT  Collect    Ordered [facility]    12/03/20 1822 12/03/20 1821  Hepatitis C antibody  STAT  Collect    Ordered [facility]    12/03/20 1822 12/03/20 1821  POCT urine pregnancy  Once      Ordered [facility]            Virtual Visit Note: The provider triage portion of this emergency department evaluation and documentation was performed via [employer], a HIPAA-compliant telemedicine application, in concert with a tele-presenter in the room. A face to face  patient evaluation with one of my colleagues will occur once the patient is placed in an emergency department room.      DISCLAIMER: This note was prepared with [employer] voice recognition transcription software. Garbled syntax, mangled pronouns, and other bizarre constructions may be attributed to that software system.

## 2020-12-04 NOTE — PLAN OF CARE
CM called and spoke with patient in [phone] for Discharge Planning Assessment. Per patient, she lives with her boyfriend in a single family apartment with 13 steps to porch and point of entry.  Patient was independent with ADLS and DID NOT use DME or in-home assistive equipment. She is not on dialysis  or COUMADIN,  she is not on any prescription medications / has resources for all daily and prescriptive needs.She does not have a local pharmacy as she is here as a college student and originally from [facility] - Agreeable to bedside delivery.   Will have help from  boyfriend (Acosta) and other immediate family upon discharge- Acosta to provide transportation home. All questions addressed. Unit and CM direct numbers provided. Will continue to follow for course of hospitalization    12/3/2020  7:47 PM  Shortness of breath [R06.02]  Tachycardia [R00.0]  Chest pain [R07.9]  Acute pulmonary embolism, unspecified pulmonary embolism type, unspecified whether acute cor pulmonale present [I26.99]  COVID-19 [U07.1]  Acute pulmonary embolism, unspecified pulmonary embolism type, unspecified whether acute cor pulmonale present [I26.99]  Acute pulmonary embolism, unspecified pulmonary embolism type, unspecified whether acute cor pulmonale present [I26.99]    PCP: Primary Doctor No    PHARMACY: No Pharmacies Listed    Payor:  / Plan:  [employer] / Product Type: [facility] /       Jahaira Yeager RN  Case Management  Ext [phone]        12/04/20 1401   Discharge Assessment   Assessment Type Discharge Planning Assessment   Confirmed/corrected address and phone number on facesheet? Yes   Assessment information obtained from? Patient   Expected Length of Stay (days) 5   Communicated expected length of stay with patient/caregiver yes   Prior to hospitilization cognitive status: Alert/Oriented   Prior to hospitalization functional status: Independent   Current cognitive status: Alert/Oriented;No Deficits   Current  Functional Status: Independent   Facility Arrived From: [facility]   Lives With significant other   Able to Return to Prior Arrangements yes   Is patient able to care for self after discharge? Yes   Who are your caregiver(s) and their phone number(s)? Ken Hale Father     [phone]    yeni michael Significant other     [phone]   Patient's perception of discharge disposition home or selfcare   Readmission Within the Last 30 Days no previous admission in last 30 days   Patient currently being followed by outpatient case management? No   Patient currently receives any other outside agency services? No   Equipment Currently Used at Home none   Do you have any problems affording any of your prescribed medications? No   Is the patient taking medications as prescribed? no  (IS NOT presently on prescription medications)   Does the patient have transportation home? Yes   Transportation Anticipated family or friend will provide   Dialysis Name and Scheduled days N/A   Does the patient receive services at the Coumadin Clinic? No   Discharge Plan A Home   Discharge Plan B Home with family   DME Needed Upon Discharge  none   Patient/Family in Agreement with Plan yes

## 2020-12-04 NOTE — PLAN OF CARE
Currently not stable for discharge -  Presently on 1LNC. . Will have 6 minute walk test to determine home oxygen needs if indicated. Anticipate home with NO NEEDS / POSSIBLE HOME O2.  Will continue to follow.    - Continue therapeutic dose LMWH, will swap to DOAC tonight if TTE completed and treat for at least 3 months for provoked VTE  - TTE  - US lower extremity duplex  - Factor V Leiden ordered. She will need to complete the remainder of her hypercoagulability workup after treatment for her VTE has concluded  - No steroid, antiviral or plasma therapy indicated given absent respiratory failure  - Tele ordered    Jahaira Yeaegr RN  Case Management  Ext [phone]        12/04/20 6980   Post-Acute Status   Post-Acute Placement Status Awaiting Internal Medical Clearance   Discharge Delays None known at this time   Discharge Plan   Discharge Plan A Home   Discharge Plan B Home with family

## 2020-12-05 ENCOUNTER — PATIENT MESSAGE (OUTPATIENT)
Dept: ADMINISTRATIVE | Facility: OTHER | Age: 20
End: 2020-12-05

## 2020-12-05 ENCOUNTER — NURSE TRIAGE (OUTPATIENT)
Dept: ADMINISTRATIVE | Facility: CLINIC | Age: 20
End: 2020-12-05

## 2020-12-05 VITALS
SYSTOLIC BLOOD PRESSURE: 114 MMHG | RESPIRATION RATE: 18 BRPM | HEIGHT: 63 IN | TEMPERATURE: 98 F | OXYGEN SATURATION: 97 % | DIASTOLIC BLOOD PRESSURE: 72 MMHG | HEART RATE: 102 BPM | BODY MASS INDEX: 28.17 KG/M2 | WEIGHT: 159 LBS

## 2020-12-05 LAB
ALBUMIN SERPL BCP-MCNC: 3.6 G/DL (ref 3.5–5.2)
ALP SERPL-CCNC: 78 U/L (ref 55–135)
ALT SERPL W/O P-5'-P-CCNC: 15 U/L (ref 10–44)
ANION GAP SERPL CALC-SCNC: 11 MMOL/L (ref 8–16)
AST SERPL-CCNC: 15 U/L (ref 10–40)
BASOPHILS # BLD AUTO: 0.03 K/UL (ref 0–0.2)
BASOPHILS NFR BLD: 0.5 % (ref 0–1.9)
BILIRUB SERPL-MCNC: 0.4 MG/DL (ref 0.1–1)
BUN SERPL-MCNC: 8 MG/DL (ref 6–20)
CALCIUM SERPL-MCNC: 9.6 MG/DL (ref 8.7–10.5)
CHLORIDE SERPL-SCNC: 108 MMOL/L (ref 95–110)
CO2 SERPL-SCNC: 24 MMOL/L (ref 23–29)
CREAT SERPL-MCNC: 0.7 MG/DL (ref 0.5–1.4)
CRP SERPL-MCNC: 12.5 MG/L (ref 0–8.2)
DIFFERENTIAL METHOD: NORMAL
EOSINOPHIL # BLD AUTO: 0.4 K/UL (ref 0–0.5)
EOSINOPHIL NFR BLD: 5.4 % (ref 0–8)
ERYTHROCYTE [DISTWIDTH] IN BLOOD BY AUTOMATED COUNT: 12.1 % (ref 11.5–14.5)
EST. GFR  (AFRICAN AMERICAN): >60 ML/MIN/1.73 M^2
EST. GFR  (NON AFRICAN AMERICAN): >60 ML/MIN/1.73 M^2
FERRITIN SERPL-MCNC: 84 NG/ML (ref 20–300)
GLUCOSE SERPL-MCNC: 94 MG/DL (ref 70–110)
HCT VFR BLD AUTO: 43.8 % (ref 37–48.5)
HGB BLD-MCNC: 14 G/DL (ref 12–16)
IMM GRANULOCYTES # BLD AUTO: 0.01 K/UL (ref 0–0.04)
IMM GRANULOCYTES NFR BLD AUTO: 0.2 % (ref 0–0.5)
LDH SERPL L TO P-CCNC: 219 U/L (ref 110–260)
LYMPHOCYTES # BLD AUTO: 2.2 K/UL (ref 1–4.8)
LYMPHOCYTES NFR BLD: 33.1 % (ref 18–48)
MCH RBC QN AUTO: 28.7 PG (ref 27–31)
MCHC RBC AUTO-ENTMCNC: 32 G/DL (ref 32–36)
MCV RBC AUTO: 90 FL (ref 82–98)
MONOCYTES # BLD AUTO: 0.5 K/UL (ref 0.3–1)
MONOCYTES NFR BLD: 7.4 % (ref 4–15)
NEUTROPHILS # BLD AUTO: 3.5 K/UL (ref 1.8–7.7)
NEUTROPHILS NFR BLD: 53.4 % (ref 38–73)
NRBC BLD-RTO: 0 /100 WBC
PLATELET # BLD AUTO: 236 K/UL (ref 150–350)
PMV BLD AUTO: 10.5 FL (ref 9.2–12.9)
POTASSIUM SERPL-SCNC: 4.3 MMOL/L (ref 3.5–5.1)
PROT SERPL-MCNC: 7.3 G/DL (ref 6–8.4)
RBC # BLD AUTO: 4.87 M/UL (ref 4–5.4)
SODIUM SERPL-SCNC: 143 MMOL/L (ref 136–145)
WBC # BLD AUTO: 6.5 K/UL (ref 3.9–12.7)

## 2020-12-05 PROCEDURE — 99900035 HC TECH TIME PER 15 MIN (STAT)

## 2020-12-05 PROCEDURE — 25000003 PHARM REV CODE 250: Performed by: HOSPITALIST

## 2020-12-05 PROCEDURE — 25000003 PHARM REV CODE 250: Performed by: INTERNAL MEDICINE

## 2020-12-05 PROCEDURE — 80053 COMPREHEN METABOLIC PANEL: CPT

## 2020-12-05 PROCEDURE — 94664 DEMO&/EVAL PT USE INHALER: CPT

## 2020-12-05 PROCEDURE — 86140 C-REACTIVE PROTEIN: CPT

## 2020-12-05 PROCEDURE — 83615 LACTATE (LD) (LDH) ENZYME: CPT

## 2020-12-05 PROCEDURE — 99238 PR HOSPITAL DISCHARGE DAY,<30 MIN: ICD-10-PCS | Mod: ,,, | Performed by: INTERNAL MEDICINE

## 2020-12-05 PROCEDURE — 94760 N-INVAS EAR/PLS OXIMETRY 1: CPT

## 2020-12-05 PROCEDURE — 99238 HOSP IP/OBS DSCHRG MGMT 30/<: CPT | Mod: ,,, | Performed by: INTERNAL MEDICINE

## 2020-12-05 PROCEDURE — 82728 ASSAY OF FERRITIN: CPT

## 2020-12-05 PROCEDURE — 85025 COMPLETE CBC W/AUTO DIFF WBC: CPT

## 2020-12-05 PROCEDURE — 94761 N-INVAS EAR/PLS OXIMETRY MLT: CPT

## 2020-12-05 RX ADMIN — MUPIROCIN: 20 OINTMENT TOPICAL at 08:12

## 2020-12-05 RX ADMIN — APIXABAN 10 MG: 5 TABLET, FILM COATED ORAL at 08:12

## 2020-12-05 NOTE — DISCHARGE INSTRUCTIONS
You have two prescriptions for apixaban for a total of three months of therapy. The first prescription includes instructions for a seven day loading period during which you take two pills (10 mg total) twice a day, after which you reducing the dose to one pill (5 mg total) twice a day for the rest of the first month. The second prescription continues the 5 mg twice daily dosing for the remainder of the treatment period.

## 2020-12-05 NOTE — TELEPHONE ENCOUNTER
patient not called to review COVID-19 Surveillance Program enrollment process. Patient signs consent and began to submit questionnaires. No contact made.    Smartphone: Yes    [facility]: Yes    Program consent: Yes    Pulse oximeter status: Yes    Verified emergency contacts: ?    Program Overview: Reviewed , no response process, and importance of correct emergency contacts in event that well-being check is warranted. Patient will call [phone] 24/7 to speak with an OnCall nurse, if needed. Pt aware that if Sp02 <94% or if they have any worsening symptoms, they need to go to the emergency department. If they are having a medical emergency, they will call 911. Otherwise, patient will continue to submit data as scheduled. Reviewed importance of wearing mask if self or family members leave the house.     Patient had no further questions.

## 2020-12-05 NOTE — DISCHARGE SUMMARY
Discharge Summary  Hospital Medicine  [facility]      Attending Physician on Discharge: Rigo Gonzalez MD  Hospital Medicine Team: [facility]OS[facility] MED G  Date of Admission:  12/3/2020     Date of Discharge:      Active Hospital Problems    Diagnosis  POA    *Acute pulmonary embolism [I26.99]  Yes    COVID-19 [U07.1]  Unknown      Resolved Hospital Problems   No resolved problems to display.       Consults: none     History of Present Illness:    Per : Patient is a 20 y.o. female who has a past medical history of Asthma presented with complaint of SOB that began 2 days ago. She reports cough, fever, body aches, congestion for a week. She tested positive for COVID-19 2 days ago. Her symptoms have improved though she now feels SOB with chest pain. Work up in ED showed elevated D-dimer. CTA with extensive right-sided pulmonary thromboemboli. No signs of right heart strain. Hemodynamically stable. Not hypoxic. Started on full dose Lovenox. Transferring due to COVID status.      On my assessment, pt. Resting comfortably. She provides a similar history to above. She reports developing URI symptoms about 10 days ago,  tested positive about 8 days ago. She states that she had cough, fatigue, and body aches. She would occasionally have HOPE.This monring, however she woke up with worsening SOB and chest pain. She denies any notable leg swelling or pain. She denies any family history of blood clotting disorders. She does report taking oral contraceptives    Hospital Course:     Ms. Hale is a 20-year-old woman with asthma who presented to [facility] with dyspnea and chest pain and was diagnosed with acute pulmonary embolism without cor pulmonale. She had recently tested positive for COVID-19 and was transferred to the COVID unit at [facility]. She had no signs of right heart strain on admission CTA or later TTE. Here she has remained hemodynamically stable on room air, tolerating therapeutic LMWH  without issue. PESI 42, I.e. class I indicating low risk from PE with the caveat that COVID patients were not a part of the creation of that risk assessment. Factor V Leiden testing was obtained, and a lower extremity duplex US was negative for DVT (though an incomplete study). She was placed on apixaban and tolerated her first dose without issue and was discharged the following day. She was instructed to hold her oral contraceptives until discussion with her gynecologist, with appointment planned in the next two weeks. A referral was placed to heme/onc for further hypercoagulability w/u at the conclusion of her anticoagulation.    From a COVID perspective she remained afebrile on room air with no signs or symptoms of ongoing infection. On the day of discharge, isolation precautions were reviewed at length verbally. The patient was also provided with written isolation guidelines modified from the [place] Department [facility] and [facility]als as well as the CDC, as part of discharge paperwork. She was referred to the COVID19 surveillance program.    Laboratory Values:  Lab Results   Component Value Date    [id number] Positive (A) 12/03/2020       Recent Labs   Lab 12/03/20 2027 12/04/20 0252 12/05/20  0320   WBC 8.36 8.99 6.50   LYMPH 20.6  1.7 32.8  3.0 33.1  2.2   HGB 14.1 12.7 14.0   HCT 42.4 39.8 43.8    219 236     Recent Labs   Lab 12/03/20 2027 12/04/20 0252 12/05/20  0320    140 143   K 4.1 3.8 4.3    109 108   CO2 22* 18* 24   BUN 8 7 8   CREATININE 0.8 0.7 0.7   GLU 90 89 94   CALCIUM 9.8 8.9 9.6     Recent Labs   Lab 12/03/20 2027 12/04/20 0252 12/05/20  0320   ALKPHOS 81 73 78   ALT 20 16 15   AST 16 16 15   ALBUMIN 4.2 3.7 3.6   PROT 7.8 7.0 7.3   BILITOT 0.2 0.3 0.4   INR 0.9  --   --         Recent Labs     12/03/20 2027 12/05/20  0320   DDIMER 3.39*  --    FERRITIN  --  84   CRP 9.1* 12.5*   LDH  --  219   TROPONINI 0.008  --          Microbiology:  Microbiology Results  (last 7 days)     ** No results found for the last 168 hours. **          Imaging:  Imaging Results          CTA Chest Non-Coronary (PE Study) (Final result)  Result time 12/03/20 21:41:46    Final result by Pura Urena MD (12/03/20 21:41:46)                 Impression:      Extensive right-sided pulmonary thromboemboli.  Additional small segmental emboli seen on the left.  No convincing evidence of right heart strain at this time.  No evidence of pulmonary hemorrhage or infarction.    COMMUNICATION  This critical result was discovered/received on 12/3/2020 at 21:35.    The critical information above was relayed directly by Pura Urena MD by telephone to Dr. Anabel Saavedra on 12/3/2020 at 21:39.      Electronically signed by: Pura Urena MD  Date:    12/03/2020  Time:    21:41             Narrative:    EXAMINATION:  CTA CHEST NON CORONARY    CLINICAL HISTORY:  PE suspected, intermediate prob, positive D-dimer;    TECHNIQUE:  Low dose axial images, sagittal and coronal reformations were obtained from the thoracic inlet to the lung bases following the IV administration of 100 mL of Omnipaque 350.  Contrast timing was optimized to evaluate the pulmonary arteries.  MIP images were performed.    COMPARISON:  None    FINDINGS:  Structures at the base of the neck are unremarkable.  Aorta is non-aneurysmal.  The heart is normal in size without pericardial effusion.  Pulmonary thromboembolus is seen within right main pulmonary artery extending into the upper, middle, and lobar arteries as well as segmental branches.  Suspected small segmental emboli also seen involving a left upper and lower lobe segmental branch.  No evidence of more central or saddle embolus.  There is no evidence of mediastinal, axillary, or hilar lymph node enlargement.  The esophagus is unremarkable along its course.    The trachea and bronchi are patent.  The lungs are symmetrically expanded.  Lungs are clear with no evidence of pulmonary  hemorrhage or infarction.  No consolidation or pleural effusion.    The visualized abdominal structures are unremarkable.  No acute osseous abnormality identified.  Extrathoracic soft tissues are unremarkable.                               X-Ray Chest AP Portable (Final result)  Result time 12/03/20 19:46:52    Final result by Pura Urena MD (12/03/20 19:46:52)                 Impression:      No acute cardiopulmonary process identified.      Electronically signed by: Pura Urena MD  Date:    12/03/2020  Time:    19:46             Narrative:    EXAMINATION:  XR CHEST AP PORTABLE    CLINICAL HISTORY:  Shortness of breath    TECHNIQUE:  Single frontal view of the chest was performed.    COMPARISON:  None    FINDINGS:  Cardiac silhouette is normal in size.  Lungs are symmetrically expanded.  No evidence of focal consolidative process, pneumothorax, or significant pleural effusion.  No acute osseous abnormality identified.                                Cardiac:      Results for orders placed during the hospital encounter of 12/03/20   Echo Color Flow Doppler? Yes    Narrative · The left ventricle is normal in size with normal systolic function. The   estimated ejection fraction is 60%.  · Normal right ventricular size with normal right ventricular systolic   function.  · Normal left ventricular diastolic function.  · The PA systolic pressure is at least 34mm Hg.  · Low central venous pressure.            Procedures:   * No surgery found *        Current Discharge Medication List      START taking these medications    Details   apixaban (ELIQUIS) 5 mg (74 tabs) DsPk For the first 7 days take two 5 mg tablets twice daily.  After 7 days take one 5 mg tablet twice daily. Finish this prescription first  Qty: 74 tablet, Refills: 0      apixaban (ELIQUIS) 5 mg Tab Take 1 tablet (5 mg total) by mouth 2 (two) times daily. Take this prescription second  Qty: 60 tablet, Refills: 1      pulse oximeter (PULSE OXIMETER)  device by Apply Externally route 2 (two) times a day. Use twice daily at 8 AM and 3 PM and record the value in MyChart as directed.  Qty: 1 each, Refills: 0    Comments: This is a NO CHARGE item.  Please override price to zero.  DO NOT PRINT.  NORMAL MODE e-PRESCRIBE ONLY.               Discharge Diet:regular diet with Normal Fluid intake of 1500 - 2000 mL per day    Activity: activity as tolerated. Cautioned against activities that would include prolonged immobilizationor place her at risk of trauma given anticoagulation    Discharge Condition: Good    Disposition: Home or Self Care    Follow up:    Follow-up Information     Karie Ahumada NP On 12/18/2020.    Specialty: Family Medicine  Why: hospital follow-up @ 1:30pm (Arrive 15 minutes prior to appointment)  Contact information:  [address]  [place] [id number] [phone]                   Tests pending at the time of discharge: none        Time spent  on the discharge of the patient including review of hospital course with the patient. reviewing discharge medications and arranging follow-up care: < 30 mins    Discharge examination: Patient was seen and examined on the date of discharge and determined to be suitable for discharge.    Rigo Gonzalez M.D.  Department of Hospital Medicine  [facility]  [phone] (pager)

## 2020-12-05 NOTE — NURSING
Pt discharged. PIV removed. Reviewed discharge instructions with verbal understanding. Awaiting for bedside meds to be delivered.

## 2020-12-06 ENCOUNTER — PATIENT MESSAGE (OUTPATIENT)
Dept: ADMINISTRATIVE | Facility: OTHER | Age: 20
End: 2020-12-06

## 2020-12-06 ENCOUNTER — NURSE TRIAGE (OUTPATIENT)
Dept: ADMINISTRATIVE | Facility: CLINIC | Age: 20
End: 2020-12-06

## 2020-12-06 NOTE — PLAN OF CARE
Patient medically ready for discharge to HOME NO NEEDS. Any necessary transport setup by . This CM scheduled or requested necessary follow-up appointments. Family/patient aware of discharge.    Future Appointments   Date Time Provider Department Center   12/18/2020  1:30 PM Karie Ahumada NP [facility] [place]ager RN  Case Management  Ext: [phone]  12/06/2020 12/06/20 1257   Final Note   Assessment Type Final Discharge Note   Anticipated Discharge Disposition Home   What phone number can be called within the next 1-3 days to see how you are doing after discharge? [phone]   Hospital Follow Up  Appt(s) scheduled? Yes   Discharge plans and expectations educations in teach back method with documentation complete? Yes  (PER BEDSIDE NURSE)   Right Care Referral Info   Post Acute Recommendation No Care   Post-Acute Status   Other Status No Post-Acute Service Needs   Discharge Delays None known at this time

## 2020-12-06 NOTE — TELEPHONE ENCOUNTER
Call placed to [phone]: COVID-19 surveillance program patient; Patient initially escalated due to no response, no answer; no contact; verified phone number; LM on  to call for further assistance [phone] or call 911 or go to nearest ER in an emergency situation; [employer] reminder message sent.    Reason for Disposition   Message left on unidentified voice mail.  Phone number verified.    Protocols used: NO CONTACT OR DUPLICATE CONTACT CALL-A-

## 2020-12-07 ENCOUNTER — PATIENT MESSAGE (OUTPATIENT)
Dept: ADMINISTRATIVE | Facility: OTHER | Age: 20
End: 2020-12-07

## 2020-12-07 LAB — AT III ACT/NOR PPP CHRO: 104 % (ref 83–118)

## 2020-12-08 ENCOUNTER — PATIENT MESSAGE (OUTPATIENT)
Dept: ADMINISTRATIVE | Facility: OTHER | Age: 20
End: 2020-12-08

## 2020-12-09 ENCOUNTER — PATIENT MESSAGE (OUTPATIENT)
Dept: ADMINISTRATIVE | Facility: OTHER | Age: 20
End: 2020-12-09

## 2020-12-09 ENCOUNTER — PATIENT MESSAGE (OUTPATIENT)
Dept: INTERNAL MEDICINE | Facility: CLINIC | Age: 20
End: 2020-12-09

## 2020-12-09 ENCOUNTER — TELEPHONE (OUTPATIENT)
Dept: HEMATOLOGY/ONCOLOGY | Facility: CLINIC | Age: 20
End: 2020-12-09

## 2020-12-10 ENCOUNTER — PATIENT MESSAGE (OUTPATIENT)
Dept: ADMINISTRATIVE | Facility: OTHER | Age: 20
End: 2020-12-10

## 2020-12-11 ENCOUNTER — PATIENT MESSAGE (OUTPATIENT)
Dept: ADMINISTRATIVE | Facility: OTHER | Age: 20
End: 2020-12-11

## 2020-12-11 ENCOUNTER — NURSE TRIAGE (OUTPATIENT)
Dept: ADMINISTRATIVE | Facility: CLINIC | Age: 20
End: 2020-12-11

## 2020-12-11 LAB
F5 GENE MUT ANL BLD/T: NORMAL
F5 P.R506Q BLD/T QL: NORMAL

## 2020-12-12 ENCOUNTER — PATIENT MESSAGE (OUTPATIENT)
Dept: ADMINISTRATIVE | Facility: OTHER | Age: 20
End: 2020-12-12

## 2020-12-13 ENCOUNTER — PATIENT MESSAGE (OUTPATIENT)
Dept: ADMINISTRATIVE | Facility: OTHER | Age: 20
End: 2020-12-13

## 2020-12-14 ENCOUNTER — PATIENT MESSAGE (OUTPATIENT)
Dept: ADMINISTRATIVE | Facility: OTHER | Age: 20
End: 2020-12-14

## 2020-12-15 ENCOUNTER — PATIENT MESSAGE (OUTPATIENT)
Dept: ADMINISTRATIVE | Facility: OTHER | Age: 20
End: 2020-12-15

## 2020-12-15 ENCOUNTER — OFFICE VISIT (OUTPATIENT)
Dept: HEMATOLOGY/ONCOLOGY | Facility: CLINIC | Age: 20
End: 2020-12-15
Payer: OTHER GOVERNMENT

## 2020-12-15 DIAGNOSIS — I26.99 ACUTE PULMONARY EMBOLISM, UNSPECIFIED PULMONARY EMBOLISM TYPE, UNSPECIFIED WHETHER ACUTE COR PULMONALE PRESENT: ICD-10-CM

## 2020-12-15 PROCEDURE — 99203 OFFICE O/P NEW LOW 30 MIN: CPT | Mod: 95,,, | Performed by: INTERNAL MEDICINE

## 2020-12-15 PROCEDURE — 99203 PR OFFICE/OUTPT VISIT, NEW, LEVL III, 30-44 MIN: ICD-10-PCS | Mod: 95,,, | Performed by: INTERNAL MEDICINE

## 2020-12-15 NOTE — LETTER
December 15, 2020      Rigo Gonzalez MD  [address]  [facility] [address]           [facility]  [address] [address]  Phone: [phone]  Fax: [phone]          Patient: Jessica Hale   MR Number: [id number]   YOB: 2000   Date of Visit: 12/15/2020       Dear Dr. Rigo Gonzalez:    Thank you for referring Jessica Hale to me for evaluation. Attached you will find relevant portions of my assessment and plan of care.    If you have questions, please do not hesitate to call me. I look forward to following Jessica Hale along with you.    Sincerely,    Sita Yates MD    Enclosure  CC:  No Recipients    If you would like to receive this communication electronically, please contact [email or web address] or [phone] to request more information on [employer] access.    For providers and/or their staff who would like to refer a patient to [facility], please contact us through our one-stop-shop provider referral line, [facility] , at [phone].    If you feel you have received this communication in error or would no longer like to receive these types of communications, please e-mail [email or web address]

## 2020-12-15 NOTE — PROGRESS NOTES
The patient location is: home  The chief complaint leading to consultation is: PE during COVID19 diagnosis    Visit type: audiovisual    Face to Face time with patient: 10 minutes  30 minutes of total time spent on the encounter, which includes face to face time and non-face to face time preparing to see the patient (eg, review of tests), Obtaining and/or reviewing separately obtained history, Documenting clinical information in the electronic or other health record, Independently interpreting results (not separately reported) and communicating results to the patient/family/caregiver, or Care coordination (not separately reported).     Each patient to whom he or she provides medical services by telemedicine is:  (1) informed of the relationship between the physician and patient and the respective role of any other health care provider with respect to management of the patient; and (2) notified that he or she may decline to receive medical services by telemedicine and may withdraw from such care at any time.    Notes: see below    Subjective:       Patient ID: Jessica Hale is a 20 y.o. female.    Chief Complaint: No chief complaint on file.    HPI     New patient visit for PE diagnosed 9 days after COVID19 diagnosis.  Still has extreme fatigue, HOPE, and shortness of breath with conversation.  On Eliquis without bleeding events  Factor V Leiden heterozygote    Review of Systems   Constitutional: Positive for appetite change. Negative for unexpected weight change.   HENT: Negative for mouth sores.    Eyes: Negative for visual disturbance.   Respiratory: Positive for cough and shortness of breath.    Cardiovascular: Positive for chest pain.   Gastrointestinal: Negative for abdominal pain and diarrhea.   Genitourinary: Positive for frequency.   Musculoskeletal: Positive for back pain.   Integumentary:  Negative for rash.   Neurological: Positive for headaches.   Hematological: Negative for adenopathy.    Psychiatric/Behavioral: The patient is nervous/anxious.          Objective:      Physical Exam    Assessment:       1. Acute pulmonary embolism, unspecified pulmonary embolism type, unspecified whether acute cor pulmonale present        Plan:       Jessica has an identifiable etiology for her PE = COVID19 virus  Recommend Eliquis for 3 months, repeat CTA at that time to determine total length of therapy (3 vs 6 months)  Discussed heterozygosity for Factor V Leiden, important to inform OB during family planning

## 2020-12-16 ENCOUNTER — PATIENT MESSAGE (OUTPATIENT)
Dept: ADMINISTRATIVE | Facility: OTHER | Age: 20
End: 2020-12-16

## 2020-12-16 ENCOUNTER — NURSE TRIAGE (OUTPATIENT)
Dept: ADMINISTRATIVE | Facility: CLINIC | Age: 20
End: 2020-12-16

## 2020-12-16 NOTE — TELEPHONE ENCOUNTER
Patient initially escalated due to no response, however patient entered vitals prior to phone call. Vital signs and symptoms did not trigger a second escalation; therefore, no follow up call is needed at this time.    Sp02: 98  Pulse: 95  Temperature: 98.8  SOB at rest (0-5): 1  SOB with activity (0-5): 1  Worsening symptoms: no  Fever symptoms: no        Reason for Disposition   Caller has cancelled the call before the first contact    Protocols used: NO CONTACT OR DUPLICATE CONTACT CALL-A-AH

## 2020-12-16 NOTE — TELEPHONE ENCOUNTER
Pt contacted through Covid Surveillance Program for No response escalation. States she overslept and will take vs and submit now. Feeling ok. No additional needs at this time. Speaking in complete sentences without difficulty. No audible wheezes noted. Advised to call back with concerns or worsening condition. Verbalized understanding.     Prior to closing chart, pt submitted entry. VS and symptoms WNL and do not require additional escalation and no further outreach required.           Reason for Disposition   Information only question and nurse able to answer    Additional Information   Negative: Nursing judgment   Negative: Nursing judgment   Negative: Nursing judgment   Negative: Nursing judgment    Protocols used: INFORMATION ONLY CALL - NO TRIAGE-A-[place]

## 2020-12-17 ENCOUNTER — PATIENT MESSAGE (OUTPATIENT)
Dept: ADMINISTRATIVE | Facility: OTHER | Age: 20
End: 2020-12-17

## 2020-12-17 ENCOUNTER — PATIENT MESSAGE (OUTPATIENT)
Dept: ADMINISTRATIVE | Facility: CLINIC | Age: 20
End: 2020-12-17

## 2020-12-18 ENCOUNTER — NURSE TRIAGE (OUTPATIENT)
Dept: ADMINISTRATIVE | Facility: CLINIC | Age: 20
End: 2020-12-18

## 2020-12-18 ENCOUNTER — PATIENT MESSAGE (OUTPATIENT)
Dept: ADMINISTRATIVE | Facility: OTHER | Age: 20
End: 2020-12-18

## 2020-12-18 ENCOUNTER — OFFICE VISIT (OUTPATIENT)
Dept: INTERNAL MEDICINE | Facility: CLINIC | Age: 20
End: 2020-12-18
Payer: OTHER GOVERNMENT

## 2020-12-18 VITALS
HEIGHT: 63 IN | DIASTOLIC BLOOD PRESSURE: 78 MMHG | SYSTOLIC BLOOD PRESSURE: 122 MMHG | WEIGHT: 164.88 LBS | HEART RATE: 80 BPM | BODY MASS INDEX: 29.21 KG/M2 | TEMPERATURE: 98 F

## 2020-12-18 DIAGNOSIS — Z00.00 ENCOUNTER FOR MEDICAL EXAMINATION TO ESTABLISH CARE: Primary | ICD-10-CM

## 2020-12-18 DIAGNOSIS — I26.99 ACUTE PULMONARY EMBOLISM, UNSPECIFIED PULMONARY EMBOLISM TYPE, UNSPECIFIED WHETHER ACUTE COR PULMONALE PRESENT: ICD-10-CM

## 2020-12-18 DIAGNOSIS — U07.1 COVID-19: ICD-10-CM

## 2020-12-18 PROCEDURE — 99999 PR PBB SHADOW E&M-EST. PATIENT-LVL III: CPT | Mod: PBBFAC,,, | Performed by: NURSE PRACTITIONER

## 2020-12-18 PROCEDURE — 99203 OFFICE O/P NEW LOW 30 MIN: CPT | Mod: S$PBB,,, | Performed by: NURSE PRACTITIONER

## 2020-12-18 PROCEDURE — 99999 PR PBB SHADOW E&M-EST. PATIENT-LVL III: ICD-10-PCS | Mod: PBBFAC,,, | Performed by: NURSE PRACTITIONER

## 2020-12-18 PROCEDURE — 99203 PR OFFICE/OUTPT VISIT, NEW, LEVL III, 30-44 MIN: ICD-10-PCS | Mod: S$PBB,,, | Performed by: NURSE PRACTITIONER

## 2020-12-18 PROCEDURE — 99213 OFFICE O/P EST LOW 20 MIN: CPT | Mod: PBBFAC,PO | Performed by: NURSE PRACTITIONER

## 2020-12-18 RX ORDER — ALBUTEROL SULFATE 0.63 MG/3ML
SOLUTION RESPIRATORY (INHALATION)
COMMUNITY

## 2020-12-18 NOTE — TELEPHONE ENCOUNTER
Pt submitted VS after [employer]. All VS and symptoms WNL so no triage required. Will continue to monitor.

## 2020-12-18 NOTE — PROGRESS NOTES
[facility] Note    Chief Complaint      Chief Complaint   Patient presents with    Follow-up     hospital     History of Present Illness      Jessica Hale is a 20 y.o. female patient with conditions of asthma, COVID virus and recent diagnosis of PE who presents today for establish care visit.  Patient denies shortness of breath or chest pain, reviewed meds and history of patient.  Patient recently hospitalized with PT after diagnosis of COVID positive.  Patient discharged on 12/05/2020, was instructed by hospital to establish with a  PCP.  Patient currently taking Eliquis monitoring O2 sats.  Patient is feeling better.  Denies worsening of symptoms.  Patient is here for [facility]    Gyn- mobile- appt in a few days.   Eye exam-  in mobile  Flu vaccine- due    H/o colonoscopy and egd for ibs/intessuseption      Discharge notes:    History of Present Illness:     Per : Patient is a 20 y.o. female who has a past medical history of Asthma presented with complaint of SOB that began 2 days ago. She reports cough, fever, body aches, congestion for a week. She tested positive for COVID-19 2 days ago. Her symptoms have improved though she now feels SOB with chest pain. Work up in ED showed elevated D-dimer. CTA with extensive right-sided pulmonary thromboemboli. No signs of right heart strain. Hemodynamically stable. Not hypoxic. Started on full dose Lovenox. Transferring due to COVID status.      On my assessment, pt. Resting comfortably. She provides a similar history to above. She reports developing URI symptoms about 10 days ago,  tested positive about 8 days ago. She states that she had cough, fatigue, and body aches. She would occasionally have HOPE.This monring, however she woke up with worsening SOB and chest pain. She denies any notable leg swelling or pain. She denies any family history of blood clotting disorders. She does report taking oral contraceptives     Hospital Course:     Ms. Hale  is a 20-year-old woman with asthma who presented to [facility] with dyspnea and chest pain and was diagnosed with acute pulmonary embolism without cor pulmonale. She had recently tested positive for COVID-19 and was transferred to the COVID unit at [facility]. She had no signs of right heart strain on admission CTA or later TTE. Here she has remained hemodynamically stable on room air, tolerating therapeutic LMWH without issue. PESI 42, I.e. class I indicating low risk from PE with the caveat that COVID patients were not a part of the creation of that risk assessment. Factor V Leiden testing was obtained, and a lower extremity duplex US was negative for DVT (though an incomplete study). She was placed on apixaban and tolerated her first dose without issue and was discharged the following day. She was instructed to hold her oral contraceptives until discussion with her gynecologist, with appointment planned in the next two weeks. A referral was placed to heme/onc for further hypercoagulability w/u at the conclusion of her anticoagulation.     From a COVID perspective she remained afebrile on room air with no signs or symptoms of ongoing infection. On the day of discharge, isolation precautions were reviewed at length verbally. The patient was also provided with written isolation guidelines modified from the [facility] as well as the CDC, as part of discharge paperwork. She was referred to the COVID19 surveillance program.      Problem List Items Addressed This Visit        Hematology    Acute pulmonary embolism       Other    COVID-19      Other Visit Diagnoses     Encounter for medical examination to establish care    -  Primary    Relevant Orders    Lipid Panel    TSH    T4, Free          Health Maintenance   Topic Date Due    Lipid Panel  1998    HPV Vaccines (1 - 2-dose series) 04/23/2009    TETANUS VACCINE  04/23/2016    Pap Smear  04/23/2019    Hepatitis C Screening   Completed       Past Medical History:   Diagnosis Date    Asthma     IBS (irritable bowel syndrome)     Scoliosis        Past Surgical History:   Procedure Laterality Date    WISDOM TOOTH EXTRACTION         family history includes Arthritis in her father and mother; COPD in her maternal grandmother; Colon cancer in her paternal grandfather; Coronary artery disease in her mother; Diabetes in her father; Hypertension in her mother; Stroke in her paternal grandmother.    Social History     Tobacco Use    Smoking status: Never Smoker   Substance Use Topics    Alcohol use: Yes     Comment: occasionally    Drug use: Not on file       Review of Systems   Constitutional: Negative for chills and fever.   HENT: Negative for congestion, sinus pain and sore throat.    Eyes: Negative for blurred vision.   Respiratory: Negative for cough, shortness of breath and wheezing.         No worsening shortness, dyspnea with exertion   Cardiovascular: Negative for chest pain, palpitations and leg swelling.   Gastrointestinal: Negative for abdominal pain, constipation, diarrhea, nausea and vomiting.   Genitourinary: Negative for dysuria.   Musculoskeletal: Negative for myalgias.   Skin: Negative for rash.   Neurological: Negative for dizziness, weakness and headaches.   Psychiatric/Behavioral: Negative for depression. The patient is not nervous/anxious.         Outpatient Encounter Medications as of 12/18/2020   Medication Sig Dispense Refill    albuterol (ACCUNEB) 0.63 mg/3 mL Nebu       apixaban (ELIQUIS) 5 mg (74 tabs) DsPk For the first 7 days take two 5 mg tablets twice daily.  After 7 days take one 5 mg tablet twice daily. Finish this prescription first 74 tablet 0    apixaban (ELIQUIS) 5 mg Tab Take 1 tablet (5 mg total) by mouth 2 (two) times daily. Take this prescription second 60 tablet 1    pulse oximeter (PULSE OXIMETER) device by Apply Externally route 2 (two) times a day. Use twice daily at 8 AM and 3 PM and record  "[facility]. 1 each 0     No facility-administered encounter medications on file as of 12/18/2020.         Review of patient's allergies indicates:  No Known Allergies    Physical Exam      Vital Signs  Temp: 97.9 °F (36.6 °C)  Temp src: Temporal  Pulse: 80  BP: 122/78  BP Location: Left arm  Patient Position: Sitting  Pain Score: 0-No pain  Height and Weight  Height: 5' 3" (160 cm)  Weight: 74.8 kg (164 lb 14.5 oz)  BSA (Calculated - sq m): 1.82 sq meters  BMI (Calculated): 29.2  Weight in (lb) to have BMI = 25: 140.8]    Physical Exam  Vitals signs and nursing note reviewed.   Constitutional:       Appearance: She is well-developed.   HENT:      Head: Normocephalic and atraumatic.      Right Ear: External ear normal.      Left Ear: External ear normal.   Eyes:      Conjunctiva/sclera: Conjunctivae normal.   Neck:      Musculoskeletal: Normal range of motion and neck supple.      Thyroid: No thyromegaly.      Vascular: No JVD.      Trachea: No tracheal deviation.   Cardiovascular:      Rate and Rhythm: Normal rate and regular rhythm.      Heart sounds: Normal heart sounds.   Pulmonary:      Effort: Pulmonary effort is normal.      Breath sounds: Normal breath sounds.   Abdominal:      General: Bowel sounds are normal.      Palpations: Abdomen is soft.   Musculoskeletal: Normal range of motion.   Lymphadenopathy:      Cervical: No cervical adenopathy.   Skin:     General: Skin is warm and dry.   Neurological:      Mental Status: She is alert and oriented to person, place, and time.   Psychiatric:         Behavior: Behavior normal.         Thought Content: Thought content normal.         Judgment: Judgment normal.          Laboratory:  CBC:  Recent Labs   Lab Result Units 12/03/20 2027 12/04/20  0252 12/05/20  0320   WBC K/uL 8.36 8.99 6.50   RBC M/uL 4.96 4.44 4.87   Hemoglobin g/dL 14.1 12.7 14.0   Hematocrit % 42.4 39.8 43.8   Platelets K/uL 212 219 236   MCV fL 86 90 90   MCH pg 28.4 28.6 " 28.7   MCHC g/dL 33.3 31.9* 32.0     CMP:  Recent Labs   Lab Result Units 12/03/20 2027 12/04/20  0252 12/05/20  0320   Glucose mg/dL 90 89 94   Calcium mg/dL 9.8 8.9 9.6   Albumin g/dL 4.2 3.7 3.6   Total Protein g/dL 7.8 7.0 7.3   Sodium mmol/L 138 140 143   Potassium mmol/L 4.1 3.8 4.3   CO2 mmol/L 22* 18* 24   Chloride mmol/L 107 109 108   BUN mg/dL 8 7 8   Alkaline Phosphatase U/L 81 73 78   ALT U/L 20 16 15   AST U/L 16 16 15   Total Bilirubin mg/dL 0.2 0.3 0.4     URINALYSIS:  No results for input(s): COLORU, CLARITYU, SPECGRAV, PHUR, PROTEINUA, GLUCOSEU, BILIRUBINCON, BLOODU, WBCU, RBCU, BACTERIA, MUCUS, NITRITE, LEUKOCYTESUR, UROBILINOGEN, HYALINECASTS in the last 2160 hours.   LIPIDS:  No results for input(s): TSH, HDL, CHOL, TRIG, LDLCALC, CHOLHDL, NONHDLCHOL, TOTALCHOLEST in the last 2160 hours.  TSH:  No results for input(s): TSH in the last 2160 hours.  A1C:  No results for input(s): HGBA1C in the last 2160 hours.      Assessment/Plan     Jessica Hale is a 20 y.o.female with:    Encounter Diagnoses   Name Primary?    Encounter for medical examination to establish care Yes    Acute pulmonary embolism, unspecified pulmonary embolism type, unspecified whether acute cor pulmonale present     COVID-19         PLAN    1.- PE- pt also monitoring O2 sats at home, on eliquis, oral contreception stopped in hospital, hem/onc referral place in hospital.  2.- COVID virus- on monitoring program, stable and resolving of symptoms      -Continue current medications and maintain follow up with specialists.  Return to clinic on 12/30/20 to est and f/u with Dr. Tari Ahumada, NP-C  [facility] [place]

## 2020-12-18 NOTE — TELEPHONE ENCOUNTER
Pt escalated for no response at 3 pm. Pt is currently at clinic for appt. Left voicemail and sent [employer] message. Will continue to monitor.    Reason for Disposition   Patient already left for the hospital/clinic.    Protocols used: NO CONTACT OR DUPLICATE CONTACT CALL-A-AH

## 2020-12-19 ENCOUNTER — PATIENT MESSAGE (OUTPATIENT)
Dept: ADMINISTRATIVE | Facility: OTHER | Age: 20
End: 2020-12-19

## 2020-12-19 ENCOUNTER — NURSE TRIAGE (OUTPATIENT)
Dept: ADMINISTRATIVE | Facility: CLINIC | Age: 20
End: 2020-12-19

## 2020-12-19 NOTE — TELEPHONE ENCOUNTER
"Patient escalated for "yes" to fever symptoms. Unable to contact pt or ECs listed. [employer] message sent.    Reason for Disposition   Caller has cancelled the call before the first contact    Additional Information   Negative: Caller has already spoken with the PCP (or office), and has no further questions   Negative: Caller has already spoken with another triager and has no further questions   Negative: Caller has already spoken with another triager or PCP (or office), and has further questions and triager able to answer questions.   Negative: Busy signal.  First attempt to contact caller.  Follow-up call scheduled within 15 minutes.   Negative: No answer.  First attempt to contact caller.  Follow-up call scheduled within 15 minutes.   Negative: Message left on identified voicemail   Negative: Message left on unidentified voice mail. Phone number verified.   Negative: Message left with person in household   Negative: Wrong number reached. Phone number verified.   Negative: Second attempt to contact family AND no contact made. Phone number verified.   Negative: Cell phone out of range. Phone number verified.   Negative: Patient already left for the hospital/clinic   Negative: Unable to complete triage due to phone connection issues    Protocols used: NO CONTACT OR DUPLICATE CONTACT CALL-A-OH      "

## 2020-12-29 ENCOUNTER — TELEPHONE (OUTPATIENT)
Dept: INTERNAL MEDICINE | Facility: CLINIC | Age: 20
End: 2020-12-29

## 2020-12-29 NOTE — TELEPHONE ENCOUNTER
----- Message from Julissa Santos sent at 12/29/2020 10:20 AM CST -----  Regarding: birth control shot?  Good morning,     Pt is requesting a call back about her birth control shot. She said she just received it from her pharmacy and she would like for someone to give her the shot tomorrow when she comes in for labs??   Please call pt.     Thanks.

## 2020-12-29 NOTE — TELEPHONE ENCOUNTER
spk to pt and informed her, we do not give birth control shots she will have to have her gyn administer it

## 2021-01-13 ENCOUNTER — TELEPHONE (OUTPATIENT)
Dept: INTERNAL MEDICINE | Facility: CLINIC | Age: 21
End: 2021-01-13

## 2021-01-14 ENCOUNTER — LAB VISIT (OUTPATIENT)
Dept: LAB | Facility: HOSPITAL | Age: 21
End: 2021-01-14
Attending: STUDENT IN AN ORGANIZED HEALTH CARE EDUCATION/TRAINING PROGRAM
Payer: OTHER GOVERNMENT

## 2021-01-14 ENCOUNTER — OFFICE VISIT (OUTPATIENT)
Dept: INTERNAL MEDICINE | Facility: CLINIC | Age: 21
End: 2021-01-14
Payer: OTHER GOVERNMENT

## 2021-01-14 VITALS
HEART RATE: 91 BPM | RESPIRATION RATE: 16 BRPM | SYSTOLIC BLOOD PRESSURE: 104 MMHG | TEMPERATURE: 98 F | WEIGHT: 165.38 LBS | DIASTOLIC BLOOD PRESSURE: 66 MMHG | BODY MASS INDEX: 29.3 KG/M2 | HEIGHT: 63 IN

## 2021-01-14 DIAGNOSIS — Z20.822 ENCOUNTER FOR LABORATORY TESTING FOR COVID-19 VIRUS: ICD-10-CM

## 2021-01-14 DIAGNOSIS — T78.3XXD ANGIOEDEMA, SUBSEQUENT ENCOUNTER: ICD-10-CM

## 2021-01-14 DIAGNOSIS — Z00.00 PHYSICAL EXAM, ANNUAL: ICD-10-CM

## 2021-01-14 DIAGNOSIS — K58.9 IRRITABLE BOWEL SYNDROME, UNSPECIFIED TYPE: ICD-10-CM

## 2021-01-14 DIAGNOSIS — Z83.719 FAMILY HISTORY OF COLONIC POLYPS: ICD-10-CM

## 2021-01-14 DIAGNOSIS — I26.99 ACUTE PULMONARY EMBOLISM, UNSPECIFIED PULMONARY EMBOLISM TYPE, UNSPECIFIED WHETHER ACUTE COR PULMONALE PRESENT: ICD-10-CM

## 2021-01-14 DIAGNOSIS — U07.1 COVID-19: ICD-10-CM

## 2021-01-14 DIAGNOSIS — Z00.00 ENCOUNTER FOR MEDICAL EXAMINATION TO ESTABLISH CARE: ICD-10-CM

## 2021-01-14 DIAGNOSIS — Z76.89 ESTABLISHING CARE WITH NEW DOCTOR, ENCOUNTER FOR: Primary | ICD-10-CM

## 2021-01-14 PROCEDURE — 99999 PR PBB SHADOW E&M-EST. PATIENT-LVL IV: CPT | Mod: PBBFAC,,, | Performed by: STUDENT IN AN ORGANIZED HEALTH CARE EDUCATION/TRAINING PROGRAM

## 2021-01-14 PROCEDURE — 90471 IMMUNIZATION ADMIN: CPT | Mod: PBBFAC,PO

## 2021-01-14 PROCEDURE — 99214 OFFICE O/P EST MOD 30 MIN: CPT | Mod: PBBFAC,PO,25 | Performed by: STUDENT IN AN ORGANIZED HEALTH CARE EDUCATION/TRAINING PROGRAM

## 2021-01-14 PROCEDURE — 84439 ASSAY OF FREE THYROXINE: CPT

## 2021-01-14 PROCEDURE — U0003 INFECTIOUS AGENT DETECTION BY NUCLEIC ACID (DNA OR RNA); SEVERE ACUTE RESPIRATORY SYNDROME CORONAVIRUS 2 (SARS-COV-2) (CORONAVIRUS DISEASE [COVID-19]), AMPLIFIED PROBE TECHNIQUE, MAKING USE OF HIGH THROUGHPUT TECHNOLOGIES AS DESCRIBED BY CMS-2020-01-R: HCPCS

## 2021-01-14 PROCEDURE — 80061 LIPID PANEL: CPT

## 2021-01-14 PROCEDURE — 36415 COLL VENOUS BLD VENIPUNCTURE: CPT | Mod: PO

## 2021-01-14 PROCEDURE — 99395 PREV VISIT EST AGE 18-39: CPT | Mod: S$PBB,,, | Performed by: STUDENT IN AN ORGANIZED HEALTH CARE EDUCATION/TRAINING PROGRAM

## 2021-01-14 PROCEDURE — 99999 PR PBB SHADOW E&M-EST. PATIENT-LVL IV: ICD-10-PCS | Mod: PBBFAC,,, | Performed by: STUDENT IN AN ORGANIZED HEALTH CARE EDUCATION/TRAINING PROGRAM

## 2021-01-14 PROCEDURE — 84443 ASSAY THYROID STIM HORMONE: CPT

## 2021-01-14 PROCEDURE — 99395 PR PREVENTIVE VISIT,EST,18-39: ICD-10-PCS | Mod: S$PBB,,, | Performed by: STUDENT IN AN ORGANIZED HEALTH CARE EDUCATION/TRAINING PROGRAM

## 2021-01-14 RX ORDER — EPINEPHRINE 0.3 MG/.3ML
1 INJECTION SUBCUTANEOUS ONCE
Qty: 1 DEVICE | Refills: 1 | Status: SHIPPED | OUTPATIENT
Start: 2021-01-14 | End: 2021-08-02

## 2021-01-15 ENCOUNTER — PATIENT MESSAGE (OUTPATIENT)
Dept: INTERNAL MEDICINE | Facility: CLINIC | Age: 21
End: 2021-01-15

## 2021-01-15 LAB
CHOLEST SERPL-MCNC: 159 MG/DL (ref 120–199)
CHOLEST/HDLC SERPL: 4 {RATIO} (ref 2–5)
HDLC SERPL-MCNC: 40 MG/DL (ref 40–75)
HDLC SERPL: 25.2 % (ref 20–50)
LDLC SERPL CALC-MCNC: 89.8 MG/DL (ref 63–159)
NONHDLC SERPL-MCNC: 119 MG/DL
SARS-COV-2 RNA RESP QL NAA+PROBE: NOT DETECTED
T4 FREE SERPL-MCNC: 1 NG/DL (ref 0.71–1.51)
TRIGL SERPL-MCNC: 146 MG/DL (ref 30–150)
TSH SERPL DL<=0.005 MIU/L-ACNC: 1.34 UIU/ML (ref 0.4–4)

## 2021-02-24 ENCOUNTER — PATIENT MESSAGE (OUTPATIENT)
Dept: INTERNAL MEDICINE | Facility: CLINIC | Age: 21
End: 2021-02-24

## 2021-03-16 ENCOUNTER — PATIENT MESSAGE (OUTPATIENT)
Dept: INTERNAL MEDICINE | Facility: CLINIC | Age: 21
End: 2021-03-16

## 2021-03-31 ENCOUNTER — PATIENT OUTREACH (OUTPATIENT)
Dept: ADMINISTRATIVE | Facility: OTHER | Age: 21
End: 2021-03-31

## 2021-04-05 ENCOUNTER — TELEPHONE (OUTPATIENT)
Dept: GASTROENTEROLOGY | Facility: CLINIC | Age: 21
End: 2021-04-05

## 2021-04-05 ENCOUNTER — HOSPITAL ENCOUNTER (OUTPATIENT)
Dept: RADIOLOGY | Facility: HOSPITAL | Age: 21
Discharge: HOME | End: 2021-04-05
Attending: INTERNAL MEDICINE
Payer: OTHER GOVERNMENT

## 2021-04-05 DIAGNOSIS — I26.99 ACUTE PULMONARY EMBOLISM, UNSPECIFIED PULMONARY EMBOLISM TYPE, UNSPECIFIED WHETHER ACUTE COR PULMONALE PRESENT: ICD-10-CM

## 2021-04-05 PROCEDURE — 71275 CTA CHEST NON CORONARY: ICD-10-PCS | Mod: 26,,, | Performed by: RADIOLOGY

## 2021-04-05 PROCEDURE — 71275 CT ANGIOGRAPHY CHEST: CPT | Mod: TC

## 2021-04-05 PROCEDURE — 71275 CT ANGIOGRAPHY CHEST: CPT | Mod: 26,,, | Performed by: RADIOLOGY

## 2021-04-05 PROCEDURE — 25500020 PHARM REV CODE 255: Performed by: INTERNAL MEDICINE

## 2021-04-05 RX ADMIN — IOHEXOL 75 ML: 350 INJECTION, SOLUTION INTRAVENOUS at 03:04

## 2021-04-13 ENCOUNTER — OFFICE VISIT (OUTPATIENT)
Dept: HEMATOLOGY/ONCOLOGY | Facility: CLINIC | Age: 21
End: 2021-04-13
Payer: OTHER GOVERNMENT

## 2021-04-13 DIAGNOSIS — I26.99 ACUTE PULMONARY EMBOLISM, UNSPECIFIED PULMONARY EMBOLISM TYPE, UNSPECIFIED WHETHER ACUTE COR PULMONALE PRESENT: Primary | ICD-10-CM

## 2021-04-13 PROCEDURE — 99215 PR OFFICE/OUTPT VISIT, EST, LEVL V, 40-54 MIN: ICD-10-PCS | Mod: 95,,, | Performed by: INTERNAL MEDICINE

## 2021-04-13 PROCEDURE — 99215 OFFICE O/P EST HI 40 MIN: CPT | Mod: 95,,, | Performed by: INTERNAL MEDICINE

## 2021-04-19 DIAGNOSIS — I26.99 ACUTE PULMONARY EMBOLISM, UNSPECIFIED PULMONARY EMBOLISM TYPE, UNSPECIFIED WHETHER ACUTE COR PULMONALE PRESENT: ICD-10-CM

## 2021-07-30 ENCOUNTER — HOSPITAL ENCOUNTER (OUTPATIENT)
Dept: RADIOLOGY | Facility: HOSPITAL | Age: 21
Discharge: HOME | End: 2021-07-30
Attending: INTERNAL MEDICINE
Payer: OTHER GOVERNMENT

## 2021-07-30 DIAGNOSIS — I26.99 ACUTE PULMONARY EMBOLISM, UNSPECIFIED PULMONARY EMBOLISM TYPE, UNSPECIFIED WHETHER ACUTE COR PULMONALE PRESENT: ICD-10-CM

## 2021-07-30 PROCEDURE — 71275 CT ANGIOGRAPHY CHEST: CPT | Mod: 26,,, | Performed by: RADIOLOGY

## 2021-07-30 PROCEDURE — 25500020 PHARM REV CODE 255: Performed by: INTERNAL MEDICINE

## 2021-07-30 PROCEDURE — 71275 CT ANGIOGRAPHY CHEST: CPT | Mod: TC

## 2021-07-30 PROCEDURE — 71275 CTA CHEST NON CORONARY: ICD-10-PCS | Mod: 26,,, | Performed by: RADIOLOGY

## 2021-07-30 RX ADMIN — IOHEXOL 75 ML: 350 INJECTION, SOLUTION INTRAVENOUS at 11:07

## 2021-08-02 ENCOUNTER — OFFICE VISIT (OUTPATIENT)
Dept: HEMATOLOGY/ONCOLOGY | Facility: CLINIC | Age: 21
End: 2021-08-02
Payer: OTHER GOVERNMENT

## 2021-08-02 DIAGNOSIS — I26.99 ACUTE PULMONARY EMBOLISM, UNSPECIFIED PULMONARY EMBOLISM TYPE, UNSPECIFIED WHETHER ACUTE COR PULMONALE PRESENT: Primary | ICD-10-CM

## 2021-08-02 PROCEDURE — 99215 PR OFFICE/OUTPT VISIT, EST, LEVL V, 40-54 MIN: ICD-10-PCS | Mod: 95,,, | Performed by: INTERNAL MEDICINE

## 2021-08-02 PROCEDURE — 99215 OFFICE O/P EST HI 40 MIN: CPT | Mod: 95,,, | Performed by: INTERNAL MEDICINE

## 2022-01-25 ENCOUNTER — PATIENT MESSAGE (OUTPATIENT)
Dept: ADMINISTRATIVE | Facility: HOSPITAL | Age: 22
End: 2022-01-25
Payer: OTHER GOVERNMENT

## 2022-01-27 ENCOUNTER — OFFICE VISIT (OUTPATIENT)
Dept: URGENT CARE | Facility: CLINIC | Age: 22
End: 2022-01-27
Payer: OTHER GOVERNMENT

## 2022-01-27 VITALS
WEIGHT: 160 LBS | BODY MASS INDEX: 27.31 KG/M2 | SYSTOLIC BLOOD PRESSURE: 105 MMHG | HEIGHT: 64 IN | HEART RATE: 83 BPM | TEMPERATURE: 98 F | DIASTOLIC BLOOD PRESSURE: 72 MMHG | RESPIRATION RATE: 16 BRPM | OXYGEN SATURATION: 98 %

## 2022-01-27 DIAGNOSIS — M79.672 LEFT FOOT PAIN: ICD-10-CM

## 2022-01-27 DIAGNOSIS — S90.30XA CONTUSION OF DORSUM OF FOOT: Primary | ICD-10-CM

## 2022-01-27 PROCEDURE — 99214 OFFICE O/P EST MOD 30 MIN: CPT | Mod: S$GLB,,, | Performed by: NURSE PRACTITIONER

## 2022-01-27 PROCEDURE — 73630 XR FOOT COMPLETE 3 VIEW LEFT: ICD-10-PCS | Mod: FY,LT,S$GLB, | Performed by: RADIOLOGY

## 2022-01-27 PROCEDURE — 99214 PR OFFICE/OUTPT VISIT, EST, LEVL IV, 30-39 MIN: ICD-10-PCS | Mod: S$GLB,,, | Performed by: NURSE PRACTITIONER

## 2022-01-27 PROCEDURE — 73630 X-RAY EXAM OF FOOT: CPT | Mod: FY,LT,S$GLB, | Performed by: RADIOLOGY

## 2022-01-27 RX ORDER — BUSPIRONE HYDROCHLORIDE 10 MG/1
10 TABLET ORAL 2 TIMES DAILY
COMMUNITY
Start: 2021-12-24

## 2022-01-27 RX ORDER — METHYLPHENIDATE HYDROCHLORIDE 10 MG/1
10 TABLET ORAL 2 TIMES DAILY
COMMUNITY
Start: 2021-12-22

## 2022-01-27 RX ORDER — TRAZODONE HYDROCHLORIDE 50 MG/1
50 TABLET ORAL NIGHTLY
COMMUNITY
Start: 2022-01-23

## 2022-01-27 RX ORDER — FLUOXETINE HYDROCHLORIDE 20 MG/1
60 CAPSULE ORAL EVERY MORNING
COMMUNITY
Start: 2022-01-23

## 2022-01-27 NOTE — PATIENT INSTRUCTIONS
Orthopedic Follow up Discharge Instructions    If your condition worsens or fails to improve we recommend that you receive another evaluation at the ER immediately or contact your PCP to discuss your concerns or return here. You must understand that you've received an urgent care treatment only and that you may be released before all your medical problems are known or treated. You the patient will arrange for follouwp care as instructed.   If you were prescribed a narcotic or muscle relaxant do not drive or operate heavy machinery while taking these medications   Tylenol or ibuprofen can also be used as directed for pain unless you have an allergy to them or medical condition such as stomach ulcers, kidney or liver disease or blood thinners etc for which you should not be taking these type of medications.   If you were given a prescription NSAID here do not also take any over the counter NSAID like ibuprofen, aleve, advil, motrin etc   RICE which means rest, ice compression and elevation are helpful.   If you have Low Back Pain and develop bowel or bladder symptoms or increase pain going down your legs go to the ER immediately.   If you were given a splint wear it at all times.   If you were given crutches use them as we instructed. Do not rest your armpits on the foam pad or you risk compressing the nerves and the vessels there   Follow up with the orthopedist in 1 week if you continue with pain.   Sometimes it can take up to 1 week for stress fractures to show up on an X-ray, and may need reimaging or a CT or MRI of the affected area.    [facility] Testing Site    Monday, Jan. 24 - Friday, Jan. 28  8 a.m. - 6 p.m.  [facility] [address]  [address]  [place] [address]  [place]

## 2022-01-27 NOTE — PROGRESS NOTES
"Subjective:       Patient ID: Jessica Hale is a 21 y.o. female.    Vitals:  height is 5' 4" (1.626 m) and weight is 72.6 kg (160 lb). Her temperature is 97.8 °F (36.6 °C). Her blood pressure is 105/72 and her pulse is 83. Her respiration is 16 and oxygen saturation is 98%.     Chief Complaint: Foot Injury    Patient states a shampoo bottle fell on her left foot and when she got out the shower she noticed a "golf ball lump" on top of her foot. Now it is bruised and a little lump on top her foot     Foot Injury   The incident occurred more than 1 week ago (01/18/2022). Injury mechanism: dropped shampoo on foot  The pain is present in the left foot. The quality of the pain is described as aching, shooting, stabbing and cramping. The pain is at a severity of 4/10. The pain is mild. Associated symptoms include numbness and tingling. Pertinent negatives include no inability to bear weight, loss of motion, loss of sensation or muscle weakness. She reports no foreign bodies present. The symptoms are aggravated by movement, weight bearing and palpation. She has tried ice and NSAIDs (soaking in warm epison salt ) for the symptoms. The treatment provided mild relief.       Neurological: Positive for numbness.       Objective:      Physical Exam   Constitutional:  Non-toxic appearance. She does not appear ill. No distress.   HENT:   Head: Normocephalic and atraumatic.   Ears:   Right Ear: External ear normal.   Left Ear: External ear normal.   Mouth/Throat:      Comments: Oropharyngeal exam not performed due to risk of viral transmission during global pandemic-- risks outweigh benefits of exam  Eyes:      extraocular movement intact   Cardiovascular:   Pulses:       Dorsalis pedis pulses are 2+ on the left side.        Posterior tibial pulses are 2+ on the left side.   Pulmonary/Chest: Effort normal.   Abdominal: Normal appearance.   Musculoskeletal:         General: Swelling, tenderness and signs of injury present.      Left " foot: Plantar foot sensation: normal.        Feet:    Neurological: no focal deficit. She is alert.   Skin: Skin is warm. Capillary refill takes less than 2 seconds.   Nursing note and vitals reviewed.      XR FOOT COMPLETE 3 VIEW LEFT    Result Date: 1/27/2022  EXAMINATION: XR FOOT COMPLETE 3 VIEW LEFT CLINICAL HISTORY: .  Pain in left foot TECHNIQUE: AP, lateral and oblique views of the left foot were performed. COMPARISON: None FINDINGS: No acute fracture or dislocation. Alignment is normal. The Lisfranc articulation is congruent. Joint spaces are preserved.     No acute osseous abnormality of the left foot. Electronically signed by: Pawan Olivares Date:    01/27/2022 Time:    17:23  Assessment:       1. Contusion of dorsum of foot    2. Left foot pain          Plan:         Contusion of dorsum of foot    Left foot pain  -     XR FOOT COMPLETE 3 VIEW LEFT; Future; Expected date: 01/27/2022  -     BANDAGE ELASTIC 3IN ACE           Medical Decision Making:   History:   Old Medical Records: I decided to obtain old medical records.  Old Records Summarized: records from clinic visits.  Independently Interpreted Test(s):   I have ordered and independently interpreted X-rays - see summary below.       <> Summary of X-Ray Reading(s): Result Date: 1/27/2022  EXAMINATION: XR FOOT COMPLETE 3 VIEW LEFT CLINICAL HISTORY: .  Pain in left foot TECHNIQUE: AP, lateral and oblique views of the left foot were performed. COMPARISON: None FINDINGS: No acute fracture or dislocation. Alignment is normal. The Lisfranc articulation is congruent. Joint spaces are preserved.     Clinical Tests:   Radiological Study: Ordered and Reviewed  Urgent Care Management:  Ace bandage for right foot contusion. RICE  Tylenol for discomfort      Patient Instructions                                Orthopedic Follow up Discharge Instructions    If your condition worsens or fails to improve we recommend that you receive another evaluation at the ER  immediately or contact your PCP to discuss your concerns or return here. You must understand that you've received an urgent care treatment only and that you may be released before all your medical problems are known or treated. You the patient will arrange for follouwp care as instructed.   If you were prescribed a narcotic or muscle relaxant do not drive or operate heavy machinery while taking these medications   Tylenol or ibuprofen can also be used as directed for pain unless you have an allergy to them or medical condition such as stomach ulcers, kidney or liver disease or blood thinners etc for which you should not be taking these type of medications.   If you were given a prescription NSAID here do not also take any over the counter NSAID like ibuprofen, aleve, advil, motrin etc   RICE which means rest, ice compression and elevation are helpful.   If you have Low Back Pain and develop bowel or bladder symptoms or increase pain going down your legs go to the ER immediately.   If you were given a splint wear it at all times.   If you were given crutches use them as we instructed. Do not rest your armpits on the foam pad or you risk compressing the nerves and the vessels there   Follow up with the orthopedist in 1 week if you continue with pain.   Sometimes it can take up to 1 week for stress fractures to show up on an X-ray, and may need reimaging or a CT or MRI of the affected area.    [facility] Testing Site    Monday, Jan. 24 - Friday, Jan. 28  8 a.m. - 6 p.m.  [facility] [address]  [address]  [place] [address]  [place]

## 2022-02-14 ENCOUNTER — PATIENT MESSAGE (OUTPATIENT)
Dept: RESEARCH | Facility: HOSPITAL | Age: 22
End: 2022-02-14
Payer: OTHER GOVERNMENT

## 2022-02-16 ENCOUNTER — PATIENT MESSAGE (OUTPATIENT)
Dept: RESEARCH | Facility: HOSPITAL | Age: 22
End: 2022-02-16
Payer: OTHER GOVERNMENT

## 2022-02-21 ENCOUNTER — RESEARCH ENCOUNTER (OUTPATIENT)
Dept: RESEARCH | Facility: HOSPITAL | Age: 22
End: 2022-02-21
Payer: OTHER GOVERNMENT

## 2022-02-21 NOTE — PROGRESS NOTES
[facility]  IR[facility] #: [id number]  IRB approval date: 1/19/2022       Patient called to discuss participation into the [facility] study. Explained overview of study. Patient expressed interest and is willing to see us on March 8th, 2022 @ 8:00am. Patient voiced understanding.  Reminder [employer] message will be sent about appointment.

## 2022-03-07 ENCOUNTER — PATIENT MESSAGE (OUTPATIENT)
Dept: RESEARCH | Facility: HOSPITAL | Age: 22
End: 2022-03-07
Payer: OTHER GOVERNMENT

## 2022-03-16 ENCOUNTER — PATIENT MESSAGE (OUTPATIENT)
Dept: ADMINISTRATIVE | Facility: HOSPITAL | Age: 22
End: 2022-03-16
Payer: OTHER GOVERNMENT

## 2024-01-06 ENCOUNTER — HOSPITAL ENCOUNTER (EMERGENCY)
Facility: HOSPITAL | Age: 24
Discharge: HOME | End: 2024-01-06
Attending: EMERGENCY MEDICINE
Payer: OTHER GOVERNMENT

## 2024-01-06 VITALS
HEIGHT: 63 IN | OXYGEN SATURATION: 99 % | DIASTOLIC BLOOD PRESSURE: 52 MMHG | TEMPERATURE: 98 F | RESPIRATION RATE: 33 BRPM | WEIGHT: 160 LBS | HEART RATE: 76 BPM | SYSTOLIC BLOOD PRESSURE: 105 MMHG | BODY MASS INDEX: 28.35 KG/M2

## 2024-01-06 DIAGNOSIS — R07.9 CHEST PAIN: Primary | ICD-10-CM

## 2024-01-06 LAB
ALBUMIN SERPL BCP-MCNC: 4.2 G/DL (ref 3.5–5.2)
ALP SERPL-CCNC: 76 U/L (ref 55–135)
ALT SERPL W/O P-5'-P-CCNC: 20 U/L (ref 10–44)
ANION GAP SERPL CALC-SCNC: 11 MMOL/L (ref 8–16)
AST SERPL-CCNC: 17 U/L (ref 10–40)
B-HCG UR QL: NEGATIVE
BACTERIA #/AREA URNS AUTO: NORMAL /HPF
BASOPHILS # BLD AUTO: 0.04 K/UL (ref 0–0.2)
BASOPHILS NFR BLD: 0.5 % (ref 0–1.9)
BILIRUB SERPL-MCNC: 0.3 MG/DL (ref 0.1–1)
BILIRUB UR QL STRIP: NEGATIVE
BUN SERPL-MCNC: 11 MG/DL (ref 6–20)
CALCIUM SERPL-MCNC: 9.3 MG/DL (ref 8.7–10.5)
CHLORIDE SERPL-SCNC: 106 MMOL/L (ref 95–110)
CLARITY UR REFRACT.AUTO: CLEAR
CO2 SERPL-SCNC: 22 MMOL/L (ref 23–29)
COLOR UR AUTO: ABNORMAL
CREAT SERPL-MCNC: 0.7 MG/DL (ref 0.5–1.4)
CTP QC/QA: YES
DIFFERENTIAL METHOD BLD: NORMAL
EOSINOPHIL # BLD AUTO: 0.1 K/UL (ref 0–0.5)
EOSINOPHIL NFR BLD: 1.2 % (ref 0–8)
ERYTHROCYTE [DISTWIDTH] IN BLOOD BY AUTOMATED COUNT: 12.1 % (ref 11.5–14.5)
EST. GFR  (NO RACE VARIABLE): >60 ML/MIN/1.73 M^2
GLUCOSE SERPL-MCNC: 96 MG/DL (ref 70–110)
GLUCOSE UR QL STRIP: NEGATIVE
HCT VFR BLD AUTO: 39.1 % (ref 37–48.5)
HCV AB SERPL QL IA: NORMAL
HGB BLD-MCNC: 13.2 G/DL (ref 12–16)
HGB UR QL STRIP: NEGATIVE
HIV 1+2 AB+HIV1 P24 AG SERPL QL IA: NORMAL
IMM GRANULOCYTES # BLD AUTO: 0.03 K/UL (ref 0–0.04)
IMM GRANULOCYTES NFR BLD AUTO: 0.4 % (ref 0–0.5)
KETONES UR QL STRIP: NEGATIVE
LEUKOCYTE ESTERASE UR QL STRIP: ABNORMAL
LYMPHOCYTES # BLD AUTO: 2.5 K/UL (ref 1–4.8)
LYMPHOCYTES NFR BLD: 30.3 % (ref 18–48)
MCH RBC QN AUTO: 29.9 PG (ref 27–31)
MCHC RBC AUTO-ENTMCNC: 33.8 G/DL (ref 32–36)
MCV RBC AUTO: 89 FL (ref 82–98)
MICROSCOPIC COMMENT: NORMAL
MONOCYTES # BLD AUTO: 0.6 K/UL (ref 0.3–1)
MONOCYTES NFR BLD: 7.2 % (ref 4–15)
NEUTROPHILS # BLD AUTO: 5 K/UL (ref 1.8–7.7)
NEUTROPHILS NFR BLD: 60.4 % (ref 38–73)
NITRITE UR QL STRIP: NEGATIVE
NRBC BLD-RTO: 0 /100 WBC
PH UR STRIP: 5 [PH] (ref 5–8)
PLATELET # BLD AUTO: 282 K/UL (ref 150–450)
PMV BLD AUTO: 10.5 FL (ref 9.2–12.9)
POTASSIUM SERPL-SCNC: 3.7 MMOL/L (ref 3.5–5.1)
PROT SERPL-MCNC: 7.2 G/DL (ref 6–8.4)
PROT UR QL STRIP: NEGATIVE
RBC # BLD AUTO: 4.41 M/UL (ref 4–5.4)
RBC #/AREA URNS AUTO: 1 /HPF (ref 0–4)
SODIUM SERPL-SCNC: 139 MMOL/L (ref 136–145)
SP GR UR STRIP: 1.01 (ref 1–1.03)
SQUAMOUS #/AREA URNS AUTO: 5 /HPF
TROPONIN I SERPL DL<=0.01 NG/ML-MCNC: <0.006 NG/ML (ref 0–0.03)
URN SPEC COLLECT METH UR: ABNORMAL
WBC # BLD AUTO: 8.34 K/UL (ref 3.9–12.7)
WBC #/AREA URNS AUTO: 1 /HPF (ref 0–5)

## 2024-01-06 PROCEDURE — 93010 ELECTROCARDIOGRAM REPORT: CPT | Mod: ,,, | Performed by: INTERNAL MEDICINE

## 2024-01-06 PROCEDURE — 99285 EMERGENCY DEPT VISIT HI MDM: CPT | Mod: 25

## 2024-01-06 PROCEDURE — 25000003 PHARM REV CODE 250: Performed by: EMERGENCY MEDICINE

## 2024-01-06 PROCEDURE — 80053 COMPREHEN METABOLIC PANEL: CPT

## 2024-01-06 PROCEDURE — 84484 ASSAY OF TROPONIN QUANT: CPT

## 2024-01-06 PROCEDURE — 86803 HEPATITIS C AB TEST: CPT | Performed by: STUDENT IN AN ORGANIZED HEALTH CARE EDUCATION/TRAINING PROGRAM

## 2024-01-06 PROCEDURE — 63600175 PHARM REV CODE 636 W HCPCS

## 2024-01-06 PROCEDURE — 85025 COMPLETE CBC W/AUTO DIFF WBC: CPT

## 2024-01-06 PROCEDURE — 81001 URINALYSIS AUTO W/SCOPE: CPT

## 2024-01-06 PROCEDURE — 87389 HIV-1 AG W/HIV-1&-2 AB AG IA: CPT | Performed by: STUDENT IN AN ORGANIZED HEALTH CARE EDUCATION/TRAINING PROGRAM

## 2024-01-06 PROCEDURE — 81025 URINE PREGNANCY TEST: CPT

## 2024-01-06 PROCEDURE — 96374 THER/PROPH/DIAG INJ IV PUSH: CPT

## 2024-01-06 PROCEDURE — 93005 ELECTROCARDIOGRAM TRACING: CPT

## 2024-01-06 PROCEDURE — 25500020 PHARM REV CODE 255: Performed by: EMERGENCY MEDICINE

## 2024-01-06 RX ORDER — ACETAMINOPHEN 500 MG
1000 TABLET ORAL
Status: DISCONTINUED | OUTPATIENT
Start: 2024-01-06 | End: 2024-01-06

## 2024-01-06 RX ORDER — ONDANSETRON 4 MG/1
4 TABLET, FILM COATED ORAL EVERY 6 HOURS
Qty: 12 TABLET | Refills: 0 | Status: SHIPPED | OUTPATIENT
Start: 2024-01-06

## 2024-01-06 RX ORDER — MAG HYDROX/ALUMINUM HYD/SIMETH 200-200-20
30 SUSPENSION, ORAL (FINAL DOSE FORM) ORAL
Status: COMPLETED | OUTPATIENT
Start: 2024-01-06 | End: 2024-01-06

## 2024-01-06 RX ORDER — ONDANSETRON 2 MG/ML
4 INJECTION INTRAMUSCULAR; INTRAVENOUS
Status: COMPLETED | OUTPATIENT
Start: 2024-01-06 | End: 2024-01-06

## 2024-01-06 RX ADMIN — ALUMINUM HYDROXIDE, MAGNESIUM HYDROXIDE, AND SIMETHICONE 30 ML: 200; 200; 20 SUSPENSION ORAL at 08:01

## 2024-01-06 RX ADMIN — ONDANSETRON 4 MG: 2 INJECTION INTRAMUSCULAR; INTRAVENOUS at 07:01

## 2024-01-06 RX ADMIN — IOHEXOL 100 ML: 350 INJECTION, SOLUTION INTRAVENOUS at 09:01

## 2024-01-06 NOTE — DISCHARGE INSTRUCTIONS
Diagnosis:   1. Chest pain      Home Care Instructions:  - You can take Tylenol or ibuprofen for pain.  - Take Zofran every 6 hours as needed for nausea.    Follow-Up Plan:  - Follow-up with: Primary care provider within 1 week    Return to the Emergency Department for symptoms including but not limited to: worsening symptoms, shortness of breath or chest pain, vomiting with inability to hold down fluids, or other concerning symptoms.

## 2024-01-06 NOTE — ED NOTES
Pt reports left sided CP, recent covid infection, nausea and diarrhea. Hx of PE. No longer on eliquis. VSS.

## 2024-01-06 NOTE — ED PROVIDER NOTES
Encounter Date: 1/6/2024       History     Chief Complaint   Patient presents with    Chest Pain     L sided CP radiating to L shoulder, nausea, palpitations x3 days. States Dx with covid on Sunday. Hx of PE     23-year-old female with history of a clotting disorder who presents to the ED for chief complaint of left-sided chest of 3 days.  States that she tested positive for COVID Sunday.  She has left-sided chest pain radiates to left shoulder currently 5/10 in pain severity.  She also endorses nausea, abdominal pain, and diarrhea.  Her abdominal pain has lower left, mid, and right abdominal pain, but states that she has had pain in these areas before due to having ovarian cysts.  Her diarrhea started 5 days ago and has consisted of loose brown stool without blood.  She denies any fevers, chills, emesis, SOB, or changes in urination.  She denies any change in her chest pain when she inhales versus exhales.  Her chest pain started Wednesday night and has been intermittent, but states that her pain has been consistent over the last 8 hours.  Of note, patient states that she had a pulmonary embolism in 2020.  She does not take any OCPs.  She did not take any medications prior to arrival.      The history is provided by the patient. No  was used.     Review of patient's allergies indicates:  No Known Allergies  Past Medical History:   Diagnosis Date    Asthma     IBS (irritable bowel syndrome)     Scoliosis      Past Surgical History:   Procedure Laterality Date    WISDOM TOOTH EXTRACTION       Family History   Problem Relation Age of Onset    Hypertension Mother     Coronary artery disease Mother     Arthritis Mother     Diabetes Father     Arthritis Father     COPD Maternal Grandmother     Stroke Paternal Grandmother     Colon cancer Paternal Grandfather      Social History     Tobacco Use    Smoking status: Never    Smokeless tobacco: Never   Substance Use Topics    Alcohol use: Yes     Comment:  occasionally     Review of Systems    Physical Exam     Initial Vitals [01/06/24 0651]   BP Pulse Resp Temp SpO2   130/69 91 18 97.8 °F (36.6 °C) 96 %      MAP       --         Physical Exam    Nursing note and vitals reviewed.  Constitutional: She appears well-developed. No distress.   HENT:   Head: Normocephalic.   Mouth/Throat: Oropharynx is clear and moist.   Eyes: Conjunctivae are normal. No scleral icterus.   Neck: Neck supple.   Normal range of motion.  Cardiovascular:  Normal rate, regular rhythm and normal heart sounds.           Pulmonary/Chest: Breath sounds normal. No respiratory distress. She has no wheezes. She has no rhonchi. She has no rales. She exhibits no tenderness.   Abdominal: Abdomen is soft. She exhibits no distension. There is abdominal tenderness (LLQ, suprapubuic, and RLQ). There is no rebound and no guarding.   Musculoskeletal:         General: Normal range of motion.      Cervical back: Normal range of motion and neck supple.     Neurological: She is alert.   Skin: Skin is warm. Capillary refill takes less than 2 seconds.   Psychiatric: She has a normal mood and affect.         ED Course   Procedures  Labs Reviewed   COMPREHENSIVE METABOLIC PANEL - Abnormal; Notable for the following components:       Result Value    CO2 22 (*)     All other components within normal limits   URINALYSIS, REFLEX TO URINE CULTURE - Abnormal; Notable for the following components:    Leukocytes, UA Trace (*)     All other components within normal limits    Narrative:     Specimen Source->Urine   HIV 1 / 2 ANTIBODY    Narrative:     Release to patient->Immediate   HEPATITIS C ANTIBODY    Narrative:     Release to patient->Immediate   CBC W/ AUTO DIFFERENTIAL   TROPONIN I   URINALYSIS MICROSCOPIC    Narrative:     Specimen Source->Urine   POCT URINE PREGNANCY        ECG Results              EKG 12-lead (Final result)  Result time 01/06/24 09:53:30      Final result by Interface, Lab In [facility] (01/06/24  09:53:30)                   Narrative:    Test Reason : R07.9,    Vent. Rate : 091 BPM     Atrial Rate : 091 BPM     P-R Int : 138 ms          QRS Dur : 080 ms      QT Int : 378 ms       P-R-T Axes : 069 025 046 degrees     QTc Int : 464 ms    Normal sinus rhythm with sinus arrhythmia  Normal ECG  When compared with ECG of 06-JAN-2024 06:56,  No significant change was found  Confirmed by Carlitos RHODES MD ([id number]) on 1/6/2024 9:53:21 AM    Referred By: CHRISTIAN   SELF           Confirmed By:Carlitos RHODES MD                                     EKG 12-lead (Final result)  Result time 01/06/24 09:54:31      Final result by Interface, Lab In [facility] (01/06/24 09:54:31)                   Narrative:    Test Reason :     Vent. Rate : 084 BPM     Atrial Rate : 084 BPM     P-R Int : 140 ms          QRS Dur : 074 ms      QT Int : 376 ms       P-R-T Axes : 073 044 037 degrees     QTc Int : 444 ms    Normal sinus rhythm with sinus arrhythmia  Normal ECG  When compared with ECG of 03-DEC-2020 18:48,  No significant change was found  Confirmed by Carlitos RHODES MD ([id number]) on 1/6/2024 9:54:21 AM    Referred By: [email or web address]   SELF           Confirmed By:Carlitos RHODES MD                                  Imaging Results              CTA Chest Non-Coronary (PE Studies) (Final result)  Result time 01/06/24 09:48:58      Final result by Geoffrey Kovacs IV, MD (01/06/24 09:48:58)                   Impression:      Suboptimal opacification of pulmonary arteries/poor contrast bolus timing which limits assessment.  Suspected small linear filling defect within proximal right lower lobar pulmonary artery, corresponds with chronic PE described on previous CTA.  No new large central pulmonary arterial filling defect to suggest acute pulmonary thromboembolism.  No evidence of right heart strain.      Electronically signed by: Geoffrey Kovacs  Date:    01/06/2024  Time:    09:48               Narrative:    EXAMINATION:  CTA CHEST NON CORONARY (PE  STUDIES)    CLINICAL HISTORY:  Pulmonary embolism (PE) suspected, unknown D-dimer;    TECHNIQUE:  Low dose axial images, sagittal and coronal reformations were obtained from the thoracic inlet to the lung bases following the IV administration of 100 mL of Omnipaque 350.  Contrast timing was optimized to evaluate the pulmonary arteries.    COMPARISON:  Chest radiograph from earlier today.  CTA chest from 07/30/2021.    FINDINGS:  Thoracic soft tissues are unremarkable.  Partially imaged thyroid is unremarkable.  No suspicious axillary lymphadenopathy.    Left-sided 3 vessel aortic arch.  No significant atherosclerotic plaque.  No aneurysm.    Heart is normal in size.  No evidence of right heart strain.  No significant pericardial fluid.    Pulmonary arteries and veins distribute normally.  Suboptimal opacification of pulmonary arteries/poor contrast bolus timing which limits assessment.  Probable linear filling defect within the proximal right lower lobar artery (series 2, image 228) which likely corresponds with chronic PE described on previous CTA although difficult to assess due to artifact from dense contrast in nearby SVC.  No new large central pulmonary arterial filling defect.    No suspicious mediastinal lymphadenopathy.  Decreased prominence of right-sided hilar lymph node.    Large airways are patent.  Lungs are symmetrically expanded and well aerated.  No focal consolidation or mass.  No pneumothorax.  No significant pleural fluid.    Visualized upper abdominal structures are unremarkable.    No acute fracture.  No aggressive osseous lesion.                                       X-Ray Chest AP Portable (Final result)  Result time 01/06/24 08:50:29      Final result by Ritu Duong MD (01/06/24 08:50:29)                   Impression:      No acute abnormality.      Electronically signed by: Ritu Duong MD  Date:    01/06/2024  Time:    08:50               Narrative:    EXAMINATION:  XR CHEST AP  PORTABLE    CLINICAL HISTORY:  chest pain;    TECHNIQUE:  Single frontal view of the chest was performed.    COMPARISON:  12/03/2020    FINDINGS:  The lungs are clear with normal appearance of pulmonary vasculature. No pleural effusion. No evident pneumothorax.    The cardiac silhouette is normal in size. The hilar and mediastinal contours are unremarkable.    Bones are intact.                                       Medications   ondansetron injection 4 mg (4 mg Intravenous Given 1/6/24 0741)   aluminum-magnesium hydroxide-simethicone 200-200-20 mg/5 mL suspension 30 mL (30 mLs Oral Given 1/6/24 0805)   iohexoL (OMNIPAQUE 350) injection 100 mL (100 mLs Intravenous Given 1/6/24 0913)     Medical Decision Making  23-year-old female with history of an unknown clotting disorder, COVID, and prior PE who presents for left-sided chest pain of 3 days.   She endorses radiation to left shoulder, nausea, abdominal pain, and diarrhea.  Denies any fevers, chills, emesis, dyspnea, or changes in urination.  Her vitals are WNL.  On exam, she has tenderness to palpation of the LLQ, suprapubic area, and RLQ.  Has known ovarian cysts.  Please see physical exam findings above for additional details.  Differential diagnoses include but are not limited to PE, COVID, URI, ACS, electrolyte abnormality, ovarian cyst.  Obtained labs, CXR, and CTA chest.  Administered Zofran for nausea and Maalox.  UA is WNL.  CXR shows no acute cardiopulmonary abnormalities.  CTA chest shows chronic PE with no evidence of acute PE or right heart strain.  Please see ED course for additional details.    Discussed clinical findings with patient and prescribed Zofran for nausea.  I also informed patient that she can take Tylenol or ibuprofen for pain.  Discussed follow-up with primary care provider and precautions for when to return to the emergency department.  I answered the patient's remaining questions.  Patient was discharged to home.    Amount and/or  Complexity of Data Reviewed  Labs: ordered. Decision-making details documented in ED Course.  Radiology: ordered.  ECG/medicine tests:  Decision-making details documented in ED Course.    Risk  OTC drugs.  Prescription drug management.               ED Course as of 01/06/24 2333   Sat Jan 06, 2024   0848 Troponin I: <0.006  Troponin WNL [MD]   0848 WBC: 8.34  No leukocytosis [MD]   1033 EKG 12-lead  Independent interpretation:   Normal sinus rhythm. Ventricular rate 84 bpm.  Normal axis.  Normal QRS duration and QT interval.  No ST segment elevation or depression.  Normal T-wave morphology. Overall impression:  Normal EKG. [LP]      ED Course User Index  [LP] Amando Moreland III, MD  [MD] Yunier Alan MD                           Clinical Impression:  Final diagnoses:  [R07.9] Chest pain (Primary)          ED Disposition Condition    Discharge Stable          ED Prescriptions       Medication Sig Dispense Start Date End Date Auth. Provider    ondansetron (ZOFRAN) 4 MG tablet Take 1 tablet (4 mg total) by mouth every 6 (six) hours. 12 tablet 1/6/2024 -- Yunier Alan MD          Follow-up Information       Follow up With Specialties Details Why Contact Info    lAl Marc MD Family Medicine Go in 1 week  [address] [facility] [address]  [phone]               Yunier Alan MD  Resident  01/06/24 0492

## 2025-06-25 ENCOUNTER — OFFICE VISIT (OUTPATIENT)
Dept: OBSTETRICS AND GYNECOLOGY | Facility: CLINIC | Age: 25
End: 2025-06-25
Attending: OBSTETRICS & GYNECOLOGY
Payer: COMMERCIAL

## 2025-06-25 VITALS
BODY MASS INDEX: 27.81 KG/M2 | DIASTOLIC BLOOD PRESSURE: 78 MMHG | WEIGHT: 156.94 LBS | SYSTOLIC BLOOD PRESSURE: 100 MMHG | HEIGHT: 63 IN

## 2025-06-25 DIAGNOSIS — Z23 NEED FOR HPV VACCINE: Primary | ICD-10-CM

## 2025-06-25 DIAGNOSIS — Z12.4 SCREENING FOR CERVICAL CANCER: ICD-10-CM

## 2025-06-25 DIAGNOSIS — Z01.419 ENCOUNTER FOR GYNECOLOGICAL EXAMINATION: Primary | ICD-10-CM

## 2025-06-25 PROCEDURE — 99999 PR PBB SHADOW E&M-EST. PATIENT-LVL III: CPT | Mod: PBBFAC,,, | Performed by: OBSTETRICS & GYNECOLOGY

## 2025-06-25 NOTE — PROGRESS NOTES
"Subjective:       Patient ID: Jessica Hale is a 25 y.o. female.    Chief Complaint:  Well Woman (New Patient/Last pap 2021 normal)      History of Present Illness  - here for annual. Regular periods.  - hasn't used protection for 2 years. Not tracking periods or timing intercourse.  - had a PE when she had COVID and was on ocps. Found to have a clotting disorder.  - has a small spot on her skin of right labia. Has been removed before a new one came in.  - has not received Gardasil injection.    Past Medical History:   Diagnosis Date    Asthma     IBS (irritable bowel syndrome)     Scoliosis        Past Surgical History:   Procedure Laterality Date    WISDOM TOOTH EXTRACTION          Family History   Problem Relation Name Age of Onset    Hypertension Mother Annabel     Coronary artery disease Mother Annabel     Arthritis Mother Annabel     Vision loss Mother Annabel     Diabetes Father Ken     Arthritis Father Ken     Hearing loss Father Ken     Vision loss Father Ken     COPD Maternal Grandmother Sally     Stroke Paternal Grandmother Leila     Colon cancer Paternal Grandfather Ken Sr     Cancer Paternal Grandfather Ken Sr     Depression Sister Anabel     Vision loss Sister Anabel     Vision loss Sister Jeanie         Social History[1]        Objective:     Vitals:    06/25/25 0837   BP: 100/78   BP Location: Left arm   Patient Position: Sitting   Weight: 71.2 kg (156 lb 15.5 oz)   Height: 5' 3" (1.6 m)       Physical Exam:   Constitutional: She is oriented to person, place, and time. She appears well-developed and well-nourished.        Pulmonary/Chest: Right breast exhibits no mass, no nipple discharge, no skin change, no tenderness and no swelling. Left breast exhibits no mass, no nipple discharge, no skin change, no tenderness and no swelling. Breasts are symmetrical.        Abdominal: Soft. She exhibits no distension. There is no abdominal tenderness.     Genitourinary:    Vagina and uterus normal. "         There is no tenderness or lesion on the right labia. There is no tenderness or lesion on the left labia. Cervix is normal. Right adnexum displays no mass, no tenderness and no fullness. Left adnexum displays no mass, no tenderness and no fullness. No vaginal discharge in the vagina.    pap smear completed   Genitourinary Comments: Skin tag vs small wart on right labia             Musculoskeletal: Moves all extremeties.       Neurological: She is alert and oriented to person, place, and time.     Psychiatric: She has a normal mood and affect.        A female chaperone was present for exam.    Assessment/ Plan:     Orders Placed This Encounter    Liquid-Based Pap Smear, Screening       Jessica was seen today for well woman.    Diagnoses and all orders for this visit:    Encounter for gynecological examination  -     Discontinue: hpv vaccine,9-karen (GARDASIL 9) vaccine 0.5 mL    Screening for cervical cancer  -     Liquid-Based Pap Smear, Screening    - discussed Gardasil series; patient would like to schedule.  - discussed how may be small wart. Get Gardasil. Consider excising tag; not crucial. She'll let me know if it changes.  - start PNV daily (can continue vitamin for hair skin nails)  - will research her chart to determine clotting disorder and make a plan for when she's pregnant/trying.  - patient will take a pregnancy test and call me if she misses her period.  - patient verbalized understanding and agrees with plan.      Follow up in about 1 year (around 6/25/2026) for annual exam.    As of April 1, 2021, the Cures Act has been passed nationally. This new law requires that all doctors progress notes, lab results, pathology reports and radiology reports be released IMMEDIATELY to the patient in the patient portal. That means that the results are released to you at the EXACT same time they are released to me. Therefore, with all of the patients that I have I am not able to reply to each patient exactly when  the results come in. So there will be a delay from when you see the results to when I see them and have time to come up with a response to send you. Also I only see these results when I am on the computer at work. So if the results come in over the weekend or after 5 pm of a work day, I will not see them until the next business day. As you can tell, this is a challenge as a physician to give every patient the quick response they hope for and deserve. So please be patient!   Thanks for your understanding and patience.         [1]   Social History  Socioeconomic History    Marital status: Single   Tobacco Use    Smoking status: Never    Smokeless tobacco: Never   Substance and Sexual Activity    Alcohol use: Yes     Comment: occasionally

## 2025-07-01 ENCOUNTER — RESULTS FOLLOW-UP (OUTPATIENT)
Dept: OBSTETRICS AND GYNECOLOGY | Facility: CLINIC | Age: 25
End: 2025-07-01

## 2025-07-08 ENCOUNTER — CLINICAL SUPPORT (OUTPATIENT)
Dept: OBSTETRICS AND GYNECOLOGY | Facility: CLINIC | Age: 25
End: 2025-07-08
Payer: COMMERCIAL

## 2025-07-08 DIAGNOSIS — Z23 NEED FOR HPV VACCINE: Primary | ICD-10-CM

## 2025-07-08 PROCEDURE — 90651 9VHPV VACCINE 2/3 DOSE IM: CPT | Mod: S$GLB,,, | Performed by: OBSTETRICS & GYNECOLOGY

## 2025-07-08 PROCEDURE — 99999 PR PBB SHADOW E&M-EST. PATIENT-LVL I: CPT | Mod: PBBFAC,,,

## 2025-07-08 PROCEDURE — 90471 IMMUNIZATION ADMIN: CPT | Mod: S$GLB,,, | Performed by: OBSTETRICS & GYNECOLOGY

## 2025-07-08 NOTE — PROGRESS NOTES
.7/8/25  Pt Provided VIS for gardasil vaccine.  Gardasil # 1 administered to the left Deltoid.   Pt tolerated well. Pt instructed to wait 15 minutes in the waiting room for any adverse reactions. Pt also Instructed next injection due on 9/11/25,  appointment scheduled. Pt verbalizes understanding to all of the above.     Ordering provider:Dr Corral    Pain before: None    Pain after: None    Patient complaints: None

## 2025-07-27 ENCOUNTER — HOSPITAL ENCOUNTER (EMERGENCY)
Facility: HOSPITAL | Age: 25
Discharge: HOME | End: 2025-07-27
Attending: EMERGENCY MEDICINE
Payer: COMMERCIAL

## 2025-07-27 VITALS
HEART RATE: 73 BPM | BODY MASS INDEX: 27.82 KG/M2 | WEIGHT: 157 LBS | SYSTOLIC BLOOD PRESSURE: 122 MMHG | DIASTOLIC BLOOD PRESSURE: 78 MMHG | RESPIRATION RATE: 16 BRPM | HEIGHT: 63 IN | OXYGEN SATURATION: 98 % | TEMPERATURE: 98 F

## 2025-07-27 DIAGNOSIS — M79.605 LEFT LEG PAIN: ICD-10-CM

## 2025-07-27 DIAGNOSIS — R20.2 PARESTHESIAS: Primary | ICD-10-CM

## 2025-07-27 PROCEDURE — 25000003 PHARM REV CODE 250: Performed by: EMERGENCY MEDICINE

## 2025-07-27 PROCEDURE — 99284 EMERGENCY DEPT VISIT MOD MDM: CPT | Mod: 25

## 2025-07-27 RX ORDER — ACETAMINOPHEN 325 MG/1
650 TABLET ORAL
Status: COMPLETED | OUTPATIENT
Start: 2025-07-27 | End: 2025-07-27

## 2025-07-27 RX ADMIN — ACETAMINOPHEN 650 MG: 325 TABLET ORAL at 07:07

## 2025-07-28 NOTE — ED TRIAGE NOTES
Jessica Hale, a 25 y.o. female presents to the ED w/ complaint of leg numbness. Pt reporting L leg numbness and pain from knee down. Pt states she has a blood clotting disorder, and hx of PE. Denies CP/SOB. + pulses intact     
152.4

## 2025-07-28 NOTE — DISCHARGE INSTRUCTIONS
Your ultrasound showed no signs of blood clot today.    Take acetaminophen (Tylenol), ibuprofen (Motrin, Advil) or naproxen (Naprosyn, Aleve) over-the-counter for pain as needed.  Please strictly follow all instructions on the packaging and do not take more medication per dose and per day than the instructions recommend.

## 2025-07-28 NOTE — ED PROVIDER NOTES
"Chief Complaint   Leg Numbness (L leg numbness from knee down and pain started later. States hx blood clotting disorder, hx PE)      History Of Present Illness   Jessica Hale is a 25 y.o. female presenting with paresthesias of the left lower leg associated with some calf pain.  The paresthesias are in the anterolateral aspect of the leg and are not circumferential.  They start from just above the knee and go to the toes.  No weakness.  She notes a history of DVT associated with factor V Leiden.  She would like to make sure that she does not have a DVT today.  No back pain or history of back injury.  Fever or chills.  No symptoms in her arms, trunk or right leg.      Review of patient's allergies indicates:  No Known Allergies    Medications Ordered Prior to Encounter[1]    Past History   As per HPI and below:  Past Medical History[2]  Past Surgical History[3]    Social History[4]    Physical Exam     Vitals:    07/27/25 1853 07/27/25 2111   BP: (!) 150/73 122/78   Pulse: 84 73   Resp: 18 16   Temp: 98.4 °F (36.9 °C) 98.3 °F (36.8 °C)   TempSrc: Oral Oral   SpO2: 100% 98%   Weight: 71.2 kg (157 lb)    Height: 5' 3" (1.6 m)      Appearance: No acute distress.  Skin: No rashes seen.  Good turgor.  No abrasions.  No ecchymoses.  Musculoskeletal: Good range of motion all joints.  No deformities.  Neck supple.  No meningismus.  Peripheral vascular: 2+ left dorsalis pedis pulse.  Neurologic: Motor intact including proximal and distal leg muscles.  Sensation grossly intact but altered in left leg as noted in HPI.  Cranial nerves intact.  Mental Status:  Alert and oriented x 3.  Appropriate, conversant.      Initial MDM   Paresthesias of the left leg with no other symptoms.  Strength exam is entirely intact.  Excellent distal pulse.  Will obtain ultrasound to rule out clot.    Medications Given     Medications   acetaminophen tablet 650 mg (650 mg Oral Given 7/27/25 1951)       Results and Course   Abnormal Labs Reviewed - " No abnormal labs to display    Imaging Results              US Lower Extremity Veins Left (Final result)  Result time 07/27/25 21:08:06      Final result by Steve Jett MD (07/27/25 21:08:06)                   Impression:      No evidence of deep venous thrombosis in the left lower extremity.      Electronically signed by: Steve Jett MD  Date:    07/27/2025  Time:    21:08               Narrative:    EXAMINATION:  US LOWER EXTREMITY VEINS LEFT    CLINICAL HISTORY:  Pain in left leg    TECHNIQUE:  Duplex and color flow Doppler evaluation and graded compression of the left lower extremity veins was performed.    COMPARISON:  Bilateral lower extremity venous Doppler ultrasound 12/04/2020    FINDINGS:  Left thigh veins: The common femoral, femoral, popliteal, upper greater saphenous, and deep femoral veins are patent and free of thrombus. The veins are normally compressible and have normal phasic flow and augmentation response.    Left calf veins: The visualized calf veins are patent.    Contralateral CFV: The contralateral (right) common femoral vein is patent and free of thrombus.    Miscellaneous: None                                                   MDM, Impression and Plan   25 y.o. female with paresthesias, leg pain, relatively intact exam.  Ultrasound shows no DVT.  Will discharge and refer to PCP for further evaluation of paresthesias.         Final diagnoses:  [M79.605] Left leg pain  [R20.2] Paresthesias (Primary)          ED Disposition Condition    Discharge Stable          ED Prescriptions    None       Follow-up Information       Follow up With Specialties Details Why Contact Info    All Marc MD Family Medicine Schedule an appointment as soon as possible for a visit   [address] [facility] [address]  [phone]                   [1]   No current facility-administered medications on file prior to encounter.     Current Outpatient Medications on File Prior to Encounter    Medication Sig Dispense Refill    albuterol (ACCUNEB) 0.63 mg/3 mL Nebu       ondansetron (ZOFRAN) 4 MG tablet Take 1 tablet (4 mg total) by mouth every 6 (six) hours. 12 tablet 0   [2]   Past Medical History:  Diagnosis Date    Asthma     IBS (irritable bowel syndrome)     Scoliosis    [3]   Past Surgical History:  Procedure Laterality Date    WISDOM TOOTH EXTRACTION     [4]   Social History  Tobacco Use    Smoking status: Never    Smokeless tobacco: Never   Substance Use Topics    Alcohol use: Yes     Comment: occasionally        Wade Garcia MD  07/27/25 4157

## 2025-07-30 NOTE — PROGRESS NOTES
[facility] Progress Note  8/4/2025          Reason for Visit:      had concerns including Hospital Follow Up.       History of Present Illness:     Patient presents today with numbness from toes to knee.    She reports frequent episodes of numbness and tingling in hands and feet since 2021. Most recently experienced a significant left-sided neurological event lasting approximately 5 days, involving numbness from toes to knee, extending to arm and left side of face. She describes symptoms as a tingling, cold sensation. Episodes occur more days than not, with ability to develop numbness after sitting for as little as 20 minutes, affecting both feet. She occasionally experiences neck and face spasms associated with these neurological symptoms. Intermittent facial numbness is noted, particularly during recent leg numbness episode, typically coinciding with left-sided symptoms. She denies consistent lateralization of symptoms, though recent episode was predominantly left-sided. She reports no associated neck or back pain.    She experiences migraines occurring a couple times per week, predominantly affecting the right side of her head. Headaches radiate through her right eye and down the back of her neck, with episodes lasting 1-2 days. She experiences associated neck spasms on the right side of her neck and face. She notes occasional post-migraine morning symptoms and describes historical muscle spasms occurring approximately 10 years ago when the right-sided symptoms initially began. She denies current numbness directly associated with her headaches.    She wears contacts for double astigmatism and describes experiencing vision issues characterized by frequent loss of focus and seeing spots, particularly when standing up. These symptoms occur consistently, and her most recent prescription check revealed no changes in vision. She attempts to mitigate spot occurrence by standing up slowly.    She  reports ongoing sleep disturbances characterized by periods of insomnia, currently managed with trazodone. She experiences alternating cycles of sleep disruption where she cannot sleep at all. She describes episodes of severe fatigue associated with migraines, during which she reports significant difficulty awakening. Her fatigue has been somewhat improved recently.    She reports experiencing prolonged fatigue as a post-COVID symptom that persisted for months following her initial COVID-19 infection. She notes significant energy depletion during this period. The onset of her current medical concerns coincided with her COVID-19 infection and subsequent long COVID experience.    She has a history of pulmonary embolism in 2020 associated with Factor V blood clotting disorder. She was treated with anticoagulants for 6 months following the pulmonary embolism. The pulmonary embolism did not fully dissolve but she was advised to discontinue anticoagulants at that time. She also has a history of intestinal telescoping requiring colonoscopy at age 14. She previously visited emergency room due to concern about potential blood clot; ultrasound was negative.    She currently takes trazodone for insomnia management.    Alcohol use is occasional, only at gatherings and very rare. She uses marijuana occasionally and reports no perceived problematic use. She denies current smoking.       Problem List:     Problem List[1]      Medications:     Current Medications[2]      Review of Systems:     Review of Systems   Constitutional:  Negative for chills and fever.   HENT:  Negative for ear pain and sore throat.    Respiratory:  Negative for cough, shortness of breath and wheezing.    Cardiovascular:  Negative for chest pain and palpitations.   Gastrointestinal:  Negative for constipation, diarrhea, nausea and vomiting.   Genitourinary:  Negative for dysuria and hematuria.   Musculoskeletal:  Negative for joint swelling and joint  "deformity.   Neurological:  Negative for dizziness and weakness.     Physical Exam:     Temp:             Blood Pressure:  102/64        Pulse:   101     Respirations:  14  Weight:   70.6 kg (155 lb 10.3 oz)  Height:   5' 3" (1.6 m)  BMI:     Body mass index is 27.57 kg/m².    Physical Exam  Constitutional:       General: She is not in acute distress.  HENT:      Right Ear: Tympanic membrane normal.      Left Ear: Tympanic membrane normal.      Nose: No congestion or rhinorrhea.      Mouth/Throat:      Pharynx: No oropharyngeal exudate or posterior oropharyngeal erythema.   Cardiovascular:      Rate and Rhythm: Normal rate and regular rhythm.   Pulmonary:      Effort: Pulmonary effort is normal. No respiratory distress.      Breath sounds: No wheezing.   Abdominal:      Palpations: Abdomen is soft.      Tenderness: There is no abdominal tenderness.   Skin:     General: Skin is warm.   Neurological:      General: No focal deficit present.      Mental Status: She is alert and oriented to person, place, and time.       Labs/Imaging/Testing:     Most recent CBC:  Lab Results   Component Value Date    WBC 8.34 01/06/2024    HGB 13.2 01/06/2024     01/06/2024    MCV 89 01/06/2024       Most recent Lipids:  Lab Results   Component Value Date    CHOL 159 01/14/2021    HDL 40 01/14/2021    LDLCALC 89.8 01/14/2021    TRIG 146 01/14/2021       Most recent Chemistry:  Lab Results   Component Value Date     01/06/2024    K 3.7 01/06/2024     01/06/2024    CO2 22 (L) 01/06/2024    BUN 11 01/06/2024    CREATININE 0.7 01/06/2024    GLU 96 01/06/2024    CALCIUM 9.3 01/06/2024    ALT 20 01/06/2024    AST 17 01/06/2024    ALKPHOS 76 01/06/2024    PROT 7.2 01/06/2024    ALBUMIN 4.2 01/06/2024       Other tests:  Lab Results   Component Value Date    TSH 1.341 01/14/2021    FREET4 1.00 01/14/2021    INR 0.9 12/03/2020    FERRITIN 84 12/05/2020    CRP 12.5 (H) 12/05/2020     Assessment and Plan:     Visit " Summary:  Considered peripheral neuropathy as initial diagnosis due to symptoms in hands and feet, sometimes extending to larger body parts.  Ruling out systemic causes such as B12 deficiency, thyroid disorder, electrolyte disturbances, and multiple sclerosis.  Clarified that MRI does not completely rule out MS, but absence of white matter lesions makes it less likely.  Mentioned that lumbar puncture is typically needed for definitive MS diagnosis.    - Ordered comprehensive lab panel including chemistry panel, protein electrophoresis, and B12 level  - Started baby aspirin 81 mg daily  - Continue trazodone for insomnia management  - Consider neurology referral and possible MRI Brain pending lab results  - Contact office to discuss lab results and next steps in 2-3 days  - May receive call from referral clerk or neurology department to schedule appointment, or attempt to schedule directly through patient portal       1. Paresthesias  2. Peripheral polyneuropathy  - Patient reports numbness from toes to knee, tingling, cold feeling lasting about 5 days, and frequent loss of feeling in hands and feet.  - Symptoms occur randomly, sometimes when sitting for 20 minutes, affecting both hands and feet (sometimes more on left side), and occasionally the face.  - Discussed possible causes including B12 deficiency, thyroid disorder, electrolyte disturbances, and multiple sclerosis.  - Ordered comprehensive lab panel including chemistry panel, protein electrophoresis, and B12 level to rule out common causes.  - If labs are negative, will refer to neurology for further evaluation including nerve conduction studies/EMG tests and consider MRI Brain to evaluate for white matter changes suggestive of MS.    - CHAGO; Future  - ANTINUCLEAR ANTIBODIES; Future  - Comprehensive metabolic panel; Future  - CK; Future  - Immunofixation electrophoresis; Future  - Protein electrophoresis, serum; Future  - Rheumatoid factor; Future  - Treponema  Pallidium Antibodies IgG, IgM; Future  - Sedimentation rate; Future  - Vitamin B12; Future    3. Factor V Leiden  - Patient has history of factor V Leiden blood clotting disorder.  - Started baby aspirin 81 mg daily unless contradicted by future hematologist consultation, especially important if considering pregnancy.  - Patient has history of pulmonary embolism in 2020, treated with 6 months of anticoagulants.  - The embolism did not fully dissolve, but no further anticoagulants were recommended.  - Started baby aspirin 81 mg daily as noted above.    4. Insomnia, unspecified type  - Patient experiences periods of insomnia that alternate with periods of fatigue and excessive sleep, noting this is part of a cycle with fatigue and migraines.  - Will continue trazodone for insomnia management.    5. Fatigue, unspecified type     6. Migraines  - Patient experiences migraines on the right side, lasting 1-2 days, radiating through the eye and down the back of the neck.  - Migraines occur a couple times weekly and are sometimes associated with numbness and vision changes.  - Will consider neurological referral and possible MRI Brain pending lab results.      - Patient had a colonoscopy at age 14 due to intestinal intussusception.  She will clarify brother's history (some concern he could have had advanced polyps) to determine her need for repeat screening at early age.      - Contact office to discuss lab results and next steps in 2-3 days.  - Patient may receive call from referral clerk or neurology department to schedule appointment, or may attempt to schedule directly through patient portal.            Alexis Martin MD  8/4/2025    This note was prepared using voice-recognition software.  Although efforts are made to proofread the note, some errors may persist in the final document.         [1]   Patient Active Problem List  Diagnosis    Acute pulmonary embolism    COVID-19   [2]   Current Outpatient Medications:      albuterol (ACCUNEB) 0.63 mg/3 mL Nebu, , Disp: , Rfl:     busPIRone (BUSPAR) 10 MG tablet, Take 1 tablet by mouth 2 (two) times daily., Disp: , Rfl:     ondansetron (ZOFRAN) 4 MG tablet, Take 1 tablet (4 mg total) by mouth every 6 (six) hours., Disp: 12 tablet, Rfl: 0    sertraline (ZOLOFT) 25 MG tablet, Take 50 mg by mouth., Disp: , Rfl:     traZODone (DESYREL) 100 MG tablet, , Disp: , Rfl:

## 2025-08-04 ENCOUNTER — LAB VISIT (OUTPATIENT)
Dept: LAB | Facility: HOSPITAL | Age: 25
End: 2025-08-04
Attending: INTERNAL MEDICINE
Payer: COMMERCIAL

## 2025-08-04 ENCOUNTER — OFFICE VISIT (OUTPATIENT)
Dept: PRIMARY CARE CLINIC | Facility: CLINIC | Age: 25
End: 2025-08-04
Payer: COMMERCIAL

## 2025-08-04 VITALS
BODY MASS INDEX: 27.57 KG/M2 | HEIGHT: 63 IN | HEART RATE: 101 BPM | RESPIRATION RATE: 14 BRPM | OXYGEN SATURATION: 98 % | SYSTOLIC BLOOD PRESSURE: 102 MMHG | DIASTOLIC BLOOD PRESSURE: 64 MMHG | WEIGHT: 155.63 LBS

## 2025-08-04 DIAGNOSIS — D68.51 FACTOR V LEIDEN: ICD-10-CM

## 2025-08-04 DIAGNOSIS — G62.9 PERIPHERAL POLYNEUROPATHY: Primary | ICD-10-CM

## 2025-08-04 DIAGNOSIS — G43.909 MIGRAINE WITHOUT STATUS MIGRAINOSUS, NOT INTRACTABLE, UNSPECIFIED MIGRAINE TYPE: ICD-10-CM

## 2025-08-04 DIAGNOSIS — R20.2 PARESTHESIAS: ICD-10-CM

## 2025-08-04 DIAGNOSIS — G47.00 INSOMNIA, UNSPECIFIED TYPE: ICD-10-CM

## 2025-08-04 DIAGNOSIS — G62.9 PERIPHERAL POLYNEUROPATHY: ICD-10-CM

## 2025-08-04 DIAGNOSIS — R53.83 FATIGUE, UNSPECIFIED TYPE: ICD-10-CM

## 2025-08-04 LAB
ALBUMIN SERPL BCP-MCNC: 4.4 G/DL (ref 3.5–5.2)
ALP SERPL-CCNC: 66 UNIT/L (ref 40–150)
ALT SERPL W/O P-5'-P-CCNC: 16 UNIT/L (ref 0–55)
ANION GAP (OHS): 7 MMOL/L (ref 8–16)
AST SERPL-CCNC: 22 UNIT/L (ref 0–50)
BILIRUB SERPL-MCNC: 0.3 MG/DL (ref 0.1–1)
BUN SERPL-MCNC: 13 MG/DL (ref 6–20)
CALCIUM SERPL-MCNC: 9.3 MG/DL (ref 8.7–10.5)
CHLORIDE SERPL-SCNC: 107 MMOL/L (ref 95–110)
CK SERPL-CCNC: 51 U/L (ref 20–180)
CO2 SERPL-SCNC: 22 MMOL/L (ref 23–29)
CREAT SERPL-MCNC: 0.8 MG/DL (ref 0.5–1.4)
ERYTHROCYTE [SEDIMENTATION RATE] IN BLOOD BY PHOTOMETRIC METHOD: 6 MM/HR
GFR SERPLBLD CREATININE-BSD FMLA CKD-EPI: >60 ML/MIN/1.73/M2
GLUCOSE SERPL-MCNC: 91 MG/DL (ref 70–110)
POTASSIUM SERPL-SCNC: 4.3 MMOL/L (ref 3.5–5.1)
PROT SERPL-MCNC: 7.1 GM/DL (ref 6–8.4)
RHEUMATOID FACT SERPL-ACNC: <13 IU/ML
SODIUM SERPL-SCNC: 136 MMOL/L (ref 136–145)
T PALLIDUM IGG+IGM SER QL: NORMAL
VIT B12 SERPL-MCNC: 215 PG/ML (ref 210–950)

## 2025-08-04 PROCEDURE — 85652 RBC SED RATE AUTOMATED: CPT

## 2025-08-04 PROCEDURE — 3078F DIAST BP <80 MM HG: CPT | Mod: CPTII,S$GLB,, | Performed by: INTERNAL MEDICINE

## 2025-08-04 PROCEDURE — 86593 SYPHILIS TEST NON-TREP QUANT: CPT

## 2025-08-04 PROCEDURE — 86334 IMMUNOFIX E-PHORESIS SERUM: CPT

## 2025-08-04 PROCEDURE — 84165 PROTEIN E-PHORESIS SERUM: CPT

## 2025-08-04 PROCEDURE — 82550 ASSAY OF CK (CPK): CPT

## 2025-08-04 PROCEDURE — 1159F MED LIST DOCD IN RCRD: CPT | Mod: CPTII,S$GLB,, | Performed by: INTERNAL MEDICINE

## 2025-08-04 PROCEDURE — 3008F BODY MASS INDEX DOCD: CPT | Mod: CPTII,S$GLB,, | Performed by: INTERNAL MEDICINE

## 2025-08-04 PROCEDURE — 99214 OFFICE O/P EST MOD 30 MIN: CPT | Mod: S$GLB,,, | Performed by: INTERNAL MEDICINE

## 2025-08-04 PROCEDURE — 82607 VITAMIN B-12: CPT

## 2025-08-04 PROCEDURE — 80053 COMPREHEN METABOLIC PANEL: CPT

## 2025-08-04 PROCEDURE — 86431 RHEUMATOID FACTOR QUANT: CPT

## 2025-08-04 PROCEDURE — 99999 PR PBB SHADOW E&M-EST. PATIENT-LVL V: CPT | Mod: PBBFAC,,, | Performed by: INTERNAL MEDICINE

## 2025-08-04 PROCEDURE — 86038 ANTINUCLEAR ANTIBODIES: CPT

## 2025-08-04 PROCEDURE — 3074F SYST BP LT 130 MM HG: CPT | Mod: CPTII,S$GLB,, | Performed by: INTERNAL MEDICINE

## 2025-08-04 PROCEDURE — 36415 COLL VENOUS BLD VENIPUNCTURE: CPT | Mod: PN

## 2025-08-04 RX ORDER — BUSPIRONE HYDROCHLORIDE 10 MG/1
1 TABLET ORAL 2 TIMES DAILY
COMMUNITY

## 2025-08-04 RX ORDER — SERTRALINE HYDROCHLORIDE 25 MG/1
50 TABLET, FILM COATED ORAL
COMMUNITY
Start: 2025-07-30

## 2025-08-04 RX ORDER — TRAZODONE HYDROCHLORIDE 100 MG/1
TABLET ORAL
COMMUNITY

## 2025-08-05 LAB
ALBUMIN, SPE (OHS): 4.31 G/DL (ref 3.35–5.55)
ALPHA 1 GLOB (OHS): 0.25 GM/DL (ref 0.17–0.41)
ALPHA 2 GLOB (OHS): 0.59 GM/DL (ref 0.43–0.99)
ANA (OHS): NORMAL
BETA GLOB (OHS): 0.72 GM/DL (ref 0.5–1.1)
GAMMA GLOBULIN (OHS): 0.82 GM/DL (ref 0.67–1.58)
PROT SERPL-MCNC: 6.7 GM/DL (ref 6–8.4)

## 2025-08-06 ENCOUNTER — PATIENT MESSAGE (OUTPATIENT)
Dept: PRIMARY CARE CLINIC | Facility: CLINIC | Age: 25
End: 2025-08-06
Payer: COMMERCIAL

## 2025-08-06 ENCOUNTER — PATIENT MESSAGE (OUTPATIENT)
Dept: HEMATOLOGY/ONCOLOGY | Facility: CLINIC | Age: 25
End: 2025-08-06
Payer: COMMERCIAL

## 2025-08-06 LAB
PATHOLOGIST INTERPRETATION - IFE SERUM (OHS): NORMAL
PATHOLOGIST REVIEW - SPE (OHS): NORMAL

## 2025-08-07 NOTE — TELEPHONE ENCOUNTER
Pt forwarded updated family hx. Unsure if I can added this information unless the pt is being check in/rooming.

## 2025-08-14 ENCOUNTER — TELEPHONE (OUTPATIENT)
Dept: NEUROLOGY | Facility: CLINIC | Age: 25
End: 2025-08-14
Payer: COMMERCIAL